# Patient Record
Sex: FEMALE | Race: WHITE | NOT HISPANIC OR LATINO | Employment: FULL TIME | URBAN - METROPOLITAN AREA
[De-identification: names, ages, dates, MRNs, and addresses within clinical notes are randomized per-mention and may not be internally consistent; named-entity substitution may affect disease eponyms.]

---

## 2017-06-20 ENCOUNTER — OFFICE VISIT (OUTPATIENT)
Dept: FAMILY MEDICINE CLINIC | Facility: CLINIC | Age: 37
End: 2017-06-20

## 2017-06-20 VITALS
BODY MASS INDEX: 22.73 KG/M2 | OXYGEN SATURATION: 99 % | SYSTOLIC BLOOD PRESSURE: 116 MMHG | HEIGHT: 67 IN | HEART RATE: 71 BPM | WEIGHT: 144.8 LBS | DIASTOLIC BLOOD PRESSURE: 70 MMHG

## 2017-06-20 DIAGNOSIS — K76.9 LESION OF LIVER: Primary | ICD-10-CM

## 2017-06-20 DIAGNOSIS — R74.8 ELEVATED LIPASE: ICD-10-CM

## 2017-06-20 DIAGNOSIS — K76.9 LIVER LESION: ICD-10-CM

## 2017-06-20 PROCEDURE — 99213 OFFICE O/P EST LOW 20 MIN: CPT | Performed by: NURSE PRACTITIONER

## 2017-06-20 NOTE — PROGRESS NOTES
"Sarahi Mooney is a 36 y.o. female.  Seen 06/20/2017    Assessment/Plan   Problem List Items Addressed This Visit     None             No Follow-up on file.  There are no Patient Instructions on file for this visit.    Vitals:    06/20/17 0835   BP: 116/70   Pulse: 71   SpO2: 99%   Weight: 144 lb 12.8 oz (65.7 kg)   Height: 67\" (170.2 cm)     Body mass index is 22.68 kg/(m^2).    Subjective     Chief Complaint   Patient presents with   • Abdominal Pain     Last Thursday ER ([facility]) follow up stated everything related to blood work and urine was normal CT showed a liver lesion pt states that hospital stated that they did not get a good view of the liver lesion   Had some abdominal pain that was lasting through the night so she went to [facility] and her CT scan and some bloodwork. Was informed that she had a \"lesion on her liver.\" Is requesting to have further testing  Social History   Substance Use Topics   • Smoking status: Never Smoker   • Smokeless tobacco: Never Used   • Alcohol use Yes      Comment: occ       History of Present Illness     The following portions of the patient's history were reviewed and updated as appropriate:PMHroutine: Social history , Allergies and Current Medications    Review of Systems   Constitutional:        Hospital Follow-up   Gastrointestinal: Positive for abdominal pain. Negative for abdominal distention.        Intermittent and generalized is much better since being seen in the emergency room.   Genitourinary: Negative for decreased urine volume, difficulty urinating, frequency and hematuria.   All other systems reviewed and are negative.      Objective   Physical Exam   Constitutional: She appears well-developed and well-nourished. No distress.   HENT:   Head: Normocephalic and atraumatic.   Eyes: EOM are normal.   Neck: Neck supple. No thyromegaly present.   Cardiovascular: Normal rate, regular rhythm and normal heart sounds.    Pulmonary/Chest: Effort normal and breath " sounds normal.   Abdominal: Soft. Bowel sounds are normal. She exhibits no distension. There is no tenderness. There is no rebound and no guarding.   Musculoskeletal: Normal range of motion.   Neurological: She is alert.   Skin: Skin is warm.   Nursing note and vitals reviewed.    Reviewed Data:  No visits with results within 1 Month(s) from this visit.  Latest known visit with results is:    Abstract on 02/29/2016   Component Date Value Ref Range Status   • HM Pap smear 03/18/2014 Negative (no evidence of intraepithelial lesion or malignancy)   Final   Reviewd labs and abdominal ct at [facility] probable hepatic adenoma.  Told her I would try and get her records and then call her back when i did and discuss what we were going to do.  Spoke with pt at 1330 hrs will do a hepatitis panel and f/u with gastroenterology  Pt good with plan

## 2017-07-05 LAB
BILIRUB DIRECT SERPL-MCNC: <0.2 MG/DL (ref 0–0.3)
GGT SERPL-CCNC: 10 U/L (ref 5–36)

## 2017-07-06 LAB
HBV CORE AB SERPL QL IA: NEGATIVE
HBV CORE IGM SERPL QL IA: NEGATIVE
HBV E AB SERPL QL IA: NEGATIVE
HBV E AG SERPL QL IA: NEGATIVE
HBV SURFACE AB SER QL: REACTIVE
HBV SURFACE AG SERPL QL IA: NEGATIVE
HCV AB S/CO SERPL IA: 0.1 S/CO RATIO (ref 0–0.9)
LABORATORY COMMENT REPORT: NORMAL

## 2017-07-17 ENCOUNTER — OFFICE VISIT (OUTPATIENT)
Dept: GASTROENTEROLOGY | Facility: CLINIC | Age: 37
End: 2017-07-17

## 2017-07-17 VITALS
BODY MASS INDEX: 22.95 KG/M2 | DIASTOLIC BLOOD PRESSURE: 68 MMHG | HEIGHT: 67 IN | SYSTOLIC BLOOD PRESSURE: 108 MMHG | WEIGHT: 146.2 LBS | TEMPERATURE: 97.9 F

## 2017-07-17 DIAGNOSIS — R93.89 ABNORMAL FINDING ON RADIOLOGY EXAM: Primary | ICD-10-CM

## 2017-07-17 PROCEDURE — 99203 OFFICE O/P NEW LOW 30 MIN: CPT | Performed by: INTERNAL MEDICINE

## 2017-07-17 NOTE — PROGRESS NOTES
Chief Complaint   Patient presents with   • Abdominal Pain       Sarahi Mooney is a 37 y.o. female who presents with Abnormal CAT scan, liver lesion    HPI Comments: 37-year-old female that was in the [facility] emergency room 4 weeks ago with abdominal pain.  CAT scan showed a 2 cm liver lesion, right lobe.  No central scar.  Radiologist described the disease either a hepatic adenoma or focal nodular hyperplasia, less likely hepatocellular carcinoma.  She has no right upper quadrant pain.  She was on oral contraceptive pills for many years.  She is not planning on getting pregnant again in her lifetime.  She has no jaundice, tea colored urine or acholic stools.      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     Social History Main Topics   • Smoking status: Never Smoker   • Smokeless tobacco: Never Used   • Alcohol use Yes      Comment: occ   • Drug use: No   • Sexual activity: Defer     Other Topics Concern   • Not on file     Social History Narrative       Family History   Problem Relation Age of Onset   • Hypertension Father    • Other Paternal Aunt      Thyroid   • Coronary artery disease Paternal Grandfather 55   • Stroke Other 75       Review of Systems   Gastrointestinal: Negative for abdominal distention, abdominal pain, anal bleeding, blood in stool, constipation, diarrhea, nausea and rectal pain.   All other systems  reviewed and are negative.      Vitals:    07/17/17 1319   BP: 108/68   Temp: 97.9 °F (36.6 °C)       Physical Exam   Constitutional: She is oriented to person, place, and time. She appears well-developed and well-nourished.   HENT:   Head: Normocephalic and atraumatic.   Eyes: EOM are normal. Pupils are equal, round, and reactive to light.   Cardiovascular: Normal rate, regular rhythm and normal heart sounds.    Pulmonary/Chest: Effort normal.   Abdominal: Soft. Bowel sounds are normal. She exhibits no distension and no mass. There is no tenderness. No hernia.   Musculoskeletal: Normal range of motion.   Neurological: She is alert and oriented to person, place, and time.   Skin: Skin is dry.   Psychiatric: She has a normal mood and affect. Her behavior is normal. Judgment and thought content normal.         Problem list    Abnormal CAT scan  Hepatic adenoma versus focal nodular hyperplasia      Assessment/Plan    I've recommended an MRI of the liver in 3 months to ensure it is not changing in size.  If it confirms an hepatic adenoma, due to the fact that she's not planning any further pregnancies, due to the fact that she is off oral contraceptive pills, due to the fact that is a small lesion and  she is asymptomatic I do think I would follow the lesion with imaging once a year for a few years as opposed to sending her to a hepatobiliary surgeon for resection

## 2017-08-28 ENCOUNTER — TRANSCRIBE ORDERS (OUTPATIENT)
Dept: ADMINISTRATIVE | Facility: HOSPITAL | Age: 37
End: 2017-08-28

## 2017-08-28 DIAGNOSIS — R93.89 ABNORMAL RADIOGRAPHIC EXAMINATION: Primary | ICD-10-CM

## 2017-09-14 DIAGNOSIS — J45.30 MILD PERSISTENT ASTHMA WITHOUT COMPLICATION: Primary | ICD-10-CM

## 2017-09-14 DIAGNOSIS — J45.20 MILD INTERMITTENT ASTHMA WITHOUT COMPLICATION: ICD-10-CM

## 2017-09-21 ENCOUNTER — OFFICE VISIT (OUTPATIENT)
Dept: FAMILY MEDICINE CLINIC | Facility: CLINIC | Age: 37
End: 2017-09-21

## 2017-09-21 VITALS
WEIGHT: 146 LBS | HEIGHT: 67 IN | SYSTOLIC BLOOD PRESSURE: 100 MMHG | DIASTOLIC BLOOD PRESSURE: 70 MMHG | HEART RATE: 72 BPM | BODY MASS INDEX: 22.91 KG/M2 | OXYGEN SATURATION: 99 %

## 2017-09-21 DIAGNOSIS — Z00.00 PHYSICAL EXAM: Primary | ICD-10-CM

## 2017-09-21 DIAGNOSIS — Z12.4 PAP SMEAR FOR CERVICAL CANCER SCREENING: ICD-10-CM

## 2017-09-21 PROCEDURE — 99395 PREV VISIT EST AGE 18-39: CPT | Performed by: NURSE PRACTITIONER

## 2017-09-21 NOTE — PROGRESS NOTES
"Sarahi Mooney is a 37 y.o. female.  Seen 09/21/2017  Here for her annual wellness exam.  Works at the [employer]   has 3 children  Wears glasses last eye exam 2 years ago.  Regular dental visits  Last Pap 3 years ago  Regular menstrual cycles  Assessment/Plan   Problem List Items Addressed This Visit     None      Visit Diagnoses     Physical exam    -  Primary    Relevant Orders    CBC Auto Differential    Comprehensive Metabolic Panel    Lipid Panel With / Chol / HDL Ratio    Pap smear for cervical cancer screening        Relevant Orders    Pap IG, Ct-Ng, Rfx HPV ASCU             No Follow-up on file.  There are no Patient Instructions on file for this visit.    Subjective     Chief Complaint   Patient presents with   • Annual Exam     Social History   Substance Use Topics   • Smoking status: Never Smoker   • Smokeless tobacco: Never Used   • Alcohol use Yes      Comment: occ       History of Present Illness     The following portions of the patient's history were reviewed and updated as appropriate:PMHroutine: Social history , Past Medical History, Surgical history , Allergies, Current Medications, Active Problem List, Family History and Health Maintenance    Review of Systems   Constitutional: Negative for fever.   Respiratory: Negative for cough.        Objective   Vitals:    09/21/17 0926   BP: 100/70   Pulse: 72   SpO2: 99%   Weight: 146 lb (66.2 kg)   Height: 67\" (170.2 cm)     Body mass index is 22.87 kg/(m^2).  Physical Exam   Constitutional: She appears well-developed and well-nourished. No distress.   HENT:   Head: Normocephalic and atraumatic.   Right Ear: External ear normal.   Left Ear: External ear normal.   Eyes: EOM are normal.   Neck: Neck supple. No thyromegaly present.   Cardiovascular: Normal rate, regular rhythm and normal heart sounds.    Pulmonary/Chest: Effort normal and breath sounds normal.   Genitourinary: Cervix exhibits no motion tenderness, no discharge and no friability. " Right adnexum displays no mass, no tenderness and no fullness. Left adnexum displays no mass, no tenderness and no fullness.   Genitourinary Comments: Breast smooth, no lumps or nipple discharge   Musculoskeletal: Normal range of motion.   Neurological: She is alert.   Skin: Skin is warm.   Nursing note and vitals reviewed.    Reviewed Data:  No visits with results within 1 Month(s) from this visit.  Latest known visit with results is:    Office Visit on 06/20/2017   Component Date Value Ref Range Status   • Bilirubin, Direct 07/05/2017 <0.2  0.0 - 0.3 mg/dL Final   • GGT 07/05/2017 10  5 - 36 U/L Final   • Hepatitis B Surface Ag 07/05/2017 Negative  Negative Final   • Hep B E Ag 07/05/2017 Negative  Negative Final   • Hep B Core IgM 07/05/2017 Negative  Negative Final   • Hep B Core Total Ab 07/05/2017 Negative  Negative Final   • Hep B E Ab 07/05/2017 Negative  Negative Final   • Hep B S Ab 07/05/2017 Reactive   Final    Comment:               Non Reactive: Inconsistent with immunity,                              less than 10 mIU/mL                Reactive:     Consistent with immunity,                              greater than 9.9 mIU/mL     • Hepatitis C Ab 07/05/2017 0.1  0.0 - 0.9 s/co ratio Final   • Comment 07/05/2017 Comment   Final    Comment: Non reactive HCV antibody screen is consistent with no HCV infection,  unless recent infection is suspected or other evidence exists to  indicate HCV infection.       Will call with results

## 2017-09-22 LAB
ALBUMIN SERPL-MCNC: 4.5 G/DL (ref 3.5–5.2)
ALBUMIN/GLOB SERPL: 2.4 G/DL
ALP SERPL-CCNC: 40 U/L (ref 39–117)
ALT SERPL-CCNC: 13 U/L (ref 1–33)
AST SERPL-CCNC: 15 U/L (ref 1–32)
BASOPHILS # BLD AUTO: 0.03 10*3/MM3 (ref 0–0.2)
BASOPHILS NFR BLD AUTO: 0.5 % (ref 0–1.5)
BILIRUB SERPL-MCNC: 0.3 MG/DL (ref 0.1–1.2)
BUN SERPL-MCNC: 15 MG/DL (ref 6–20)
BUN/CREAT SERPL: 19.5 (ref 7–25)
CALCIUM SERPL-MCNC: 9.6 MG/DL (ref 8.6–10.5)
CHLORIDE SERPL-SCNC: 102 MMOL/L (ref 98–107)
CHOLEST SERPL-MCNC: 195 MG/DL (ref 0–200)
CHOLEST/HDLC SERPL: 2.6 {RATIO}
CO2 SERPL-SCNC: 27.4 MMOL/L (ref 22–29)
CREAT SERPL-MCNC: 0.77 MG/DL (ref 0.57–1)
EOSINOPHIL # BLD AUTO: 0.1 10*3/MM3 (ref 0–0.7)
EOSINOPHIL NFR BLD AUTO: 1.6 % (ref 0.3–6.2)
ERYTHROCYTE [DISTWIDTH] IN BLOOD BY AUTOMATED COUNT: 13.5 % (ref 11.7–13)
GLOBULIN SER CALC-MCNC: 1.9 GM/DL
GLUCOSE SERPL-MCNC: 70 MG/DL (ref 65–99)
HCT VFR BLD AUTO: 37.4 % (ref 35.6–45.5)
HDLC SERPL-MCNC: 75 MG/DL (ref 40–60)
HGB BLD-MCNC: 12.1 G/DL (ref 11.9–15.5)
IMM GRANULOCYTES # BLD: 0 10*3/MM3 (ref 0–0.03)
IMM GRANULOCYTES NFR BLD: 0 % (ref 0–0.5)
LDLC SERPL CALC-MCNC: 107 MG/DL (ref 0–100)
LYMPHOCYTES # BLD AUTO: 2.46 10*3/MM3 (ref 0.9–4.8)
LYMPHOCYTES NFR BLD AUTO: 38.8 % (ref 19.6–45.3)
MCH RBC QN AUTO: 31.7 PG (ref 26.9–32)
MCHC RBC AUTO-ENTMCNC: 32.4 G/DL (ref 32.4–36.3)
MCV RBC AUTO: 97.9 FL (ref 80.5–98.2)
MONOCYTES # BLD AUTO: 0.47 10*3/MM3 (ref 0.2–1.2)
MONOCYTES NFR BLD AUTO: 7.4 % (ref 5–12)
NEUTROPHILS # BLD AUTO: 3.28 10*3/MM3 (ref 1.9–8.1)
NEUTROPHILS NFR BLD AUTO: 51.7 % (ref 42.7–76)
PLATELET # BLD AUTO: 286 10*3/MM3 (ref 140–500)
POTASSIUM SERPL-SCNC: 4.5 MMOL/L (ref 3.5–5.2)
PROT SERPL-MCNC: 6.4 G/DL (ref 6–8.5)
RBC # BLD AUTO: 3.82 10*6/MM3 (ref 3.9–5.2)
SODIUM SERPL-SCNC: 140 MMOL/L (ref 136–145)
TRIGL SERPL-MCNC: 64 MG/DL (ref 0–150)
VLDLC SERPL CALC-MCNC: 12.8 MG/DL (ref 5–40)
WBC # BLD AUTO: 6.34 10*3/MM3 (ref 4.5–10.7)

## 2017-09-25 LAB
C TRACH RRNA CVX QL NAA+PROBE: NEGATIVE
CONV .: NORMAL
CYTOLOGIST CVX/VAG CYTO: NORMAL
CYTOLOGY CVX/VAG DOC THIN PREP: NORMAL
DX ICD CODE: NORMAL
HIV 1 & 2 AB SER-IMP: NORMAL
N GONORRHOEA RRNA CVX QL NAA+PROBE: NEGATIVE
OTHER STN SPEC: NORMAL
PATH REPORT.FINAL DX SPEC: NORMAL
STAT OF ADQ CVX/VAG CYTO-IMP: NORMAL

## 2017-10-05 ENCOUNTER — TELEPHONE (OUTPATIENT)
Dept: GASTROENTEROLOGY | Facility: CLINIC | Age: 37
End: 2017-10-05

## 2017-10-05 NOTE — TELEPHONE ENCOUNTER
Anthony from Xray at [facility] called and requested to correct a MRI of the abdomen order. States it needs to read with and without contrast.  Permission given to change the order.

## 2017-10-09 ENCOUNTER — HOSPITAL ENCOUNTER (OUTPATIENT)
Dept: MRI IMAGING | Facility: HOSPITAL | Age: 37
Discharge: HOME | End: 2017-10-09
Attending: INTERNAL MEDICINE | Admitting: INTERNAL MEDICINE

## 2017-10-09 DIAGNOSIS — R93.89 ABNORMAL RADIOGRAPHIC EXAMINATION: ICD-10-CM

## 2017-10-09 PROCEDURE — A9577 INJ MULTIHANCE: HCPCS | Performed by: INTERNAL MEDICINE

## 2017-10-09 PROCEDURE — 74183 MRI ABD W/O CNTR FLWD CNTR: CPT

## 2017-10-09 PROCEDURE — 0 GADOBENATE DIMEGLUMINE 529 MG/ML SOLUTION: Performed by: INTERNAL MEDICINE

## 2017-10-09 RX ADMIN — GADOBENATE DIMEGLUMINE 13 ML: 529 INJECTION, SOLUTION INTRAVENOUS at 08:00

## 2017-10-12 ENCOUNTER — TELEPHONE (OUTPATIENT)
Dept: GASTROENTEROLOGY | Facility: CLINIC | Age: 37
End: 2017-10-12

## 2017-10-12 DIAGNOSIS — K76.89 HEPATIC FOCAL NODULAR HYPERPLASIA: Primary | ICD-10-CM

## 2017-10-12 NOTE — TELEPHONE ENCOUNTER
----- Message from Lloyd Kauffman MD sent at 10/12/2017  1:18 PM EDT -----  Benign focal nodular hyperplasia.  Recommendation of MRI in 1 year to confirm stability.  If it is stable in one year no further workup is required

## 2017-10-12 NOTE — PROGRESS NOTES
Benign focal nodular hyperplasia.  Recommendation of MRI in 1 year to confirm stability.  If it is stable in one year no further workup is required

## 2017-10-13 NOTE — TELEPHONE ENCOUNTER
Patient returned phone call, advised as per Dr. Kauffman's note. She verb understanding is in agreement with the plan.

## 2018-09-25 ENCOUNTER — OFFICE VISIT (OUTPATIENT)
Dept: FAMILY MEDICINE CLINIC | Facility: CLINIC | Age: 38
End: 2018-09-25

## 2018-09-25 VITALS
SYSTOLIC BLOOD PRESSURE: 102 MMHG | HEIGHT: 67 IN | BODY MASS INDEX: 22.91 KG/M2 | WEIGHT: 146 LBS | DIASTOLIC BLOOD PRESSURE: 60 MMHG | HEART RATE: 66 BPM | OXYGEN SATURATION: 99 %

## 2018-09-25 DIAGNOSIS — Z00.00 PHYSICAL EXAM: Primary | ICD-10-CM

## 2018-09-25 DIAGNOSIS — J45.909 MILD ASTHMA, UNSPECIFIED WHETHER COMPLICATED, UNSPECIFIED WHETHER PERSISTENT: ICD-10-CM

## 2018-09-25 LAB
ALBUMIN SERPL-MCNC: 4.8 G/DL (ref 3.5–5.2)
ALBUMIN/GLOB SERPL: 2 G/DL
ALP SERPL-CCNC: 50 U/L (ref 39–117)
ALT SERPL-CCNC: 13 U/L (ref 1–33)
AST SERPL-CCNC: 13 U/L (ref 1–32)
BASOPHILS # BLD AUTO: 0.04 10*3/MM3 (ref 0–0.2)
BASOPHILS NFR BLD AUTO: 0.7 % (ref 0–1.5)
BILIRUB SERPL-MCNC: 0.4 MG/DL (ref 0.1–1.2)
BUN SERPL-MCNC: 12 MG/DL (ref 6–20)
BUN/CREAT SERPL: 13.2 (ref 7–25)
CALCIUM SERPL-MCNC: 9.8 MG/DL (ref 8.6–10.5)
CHLORIDE SERPL-SCNC: 101 MMOL/L (ref 98–107)
CHOLEST SERPL-MCNC: 211 MG/DL (ref 0–200)
CHOLEST/HDLC SERPL: 3.06 {RATIO}
CO2 SERPL-SCNC: 28.9 MMOL/L (ref 22–29)
CREAT SERPL-MCNC: 0.91 MG/DL (ref 0.57–1)
EOSINOPHIL # BLD AUTO: 0.08 10*3/MM3 (ref 0–0.7)
EOSINOPHIL NFR BLD AUTO: 1.4 % (ref 0.3–6.2)
ERYTHROCYTE [DISTWIDTH] IN BLOOD BY AUTOMATED COUNT: 12.8 % (ref 11.7–13)
GLOBULIN SER CALC-MCNC: 2.4 GM/DL
GLUCOSE SERPL-MCNC: 93 MG/DL (ref 65–99)
HCT VFR BLD AUTO: 40.2 % (ref 35.6–45.5)
HDLC SERPL-MCNC: 69 MG/DL (ref 40–60)
HGB BLD-MCNC: 13.4 G/DL (ref 11.9–15.5)
IMM GRANULOCYTES # BLD: 0.02 10*3/MM3 (ref 0–0.03)
IMM GRANULOCYTES NFR BLD: 0.3 % (ref 0–0.5)
LDLC SERPL CALC-MCNC: 127 MG/DL (ref 0–100)
LYMPHOCYTES # BLD AUTO: 2.32 10*3/MM3 (ref 0.9–4.8)
LYMPHOCYTES NFR BLD AUTO: 40 % (ref 19.6–45.3)
MCH RBC QN AUTO: 31.9 PG (ref 26.9–32)
MCHC RBC AUTO-ENTMCNC: 33.3 G/DL (ref 32.4–36.3)
MCV RBC AUTO: 95.7 FL (ref 80.5–98.2)
MONOCYTES # BLD AUTO: 0.37 10*3/MM3 (ref 0.2–1.2)
MONOCYTES NFR BLD AUTO: 6.4 % (ref 5–12)
NEUTROPHILS # BLD AUTO: 2.99 10*3/MM3 (ref 1.9–8.1)
NEUTROPHILS NFR BLD AUTO: 51.5 % (ref 42.7–76)
PLATELET # BLD AUTO: 397 10*3/MM3 (ref 140–500)
POTASSIUM SERPL-SCNC: 4.7 MMOL/L (ref 3.5–5.2)
PROT SERPL-MCNC: 7.2 G/DL (ref 6–8.5)
RBC # BLD AUTO: 4.2 10*6/MM3 (ref 3.9–5.2)
SODIUM SERPL-SCNC: 139 MMOL/L (ref 136–145)
TRIGL SERPL-MCNC: 77 MG/DL (ref 0–150)
VLDLC SERPL CALC-MCNC: 15.4 MG/DL (ref 5–40)
WBC # BLD AUTO: 5.8 10*3/MM3 (ref 4.5–10.7)

## 2018-09-25 PROCEDURE — 99395 PREV VISIT EST AGE 18-39: CPT | Performed by: NURSE PRACTITIONER

## 2018-10-05 ENCOUNTER — HOSPITAL ENCOUNTER (OUTPATIENT)
Dept: MRI IMAGING | Facility: HOSPITAL | Age: 38
Discharge: HOME | End: 2018-10-05
Attending: INTERNAL MEDICINE | Admitting: INTERNAL MEDICINE

## 2018-10-05 DIAGNOSIS — K76.89 HEPATIC FOCAL NODULAR HYPERPLASIA: ICD-10-CM

## 2018-10-05 PROCEDURE — A9581 GADOXETATE DISODIUM INJ: HCPCS | Performed by: INTERNAL MEDICINE

## 2018-10-05 PROCEDURE — 74183 MRI ABD W/O CNTR FLWD CNTR: CPT

## 2018-10-05 PROCEDURE — 25010000002 GADOXETATE 0.25 MOL/L SOLUTION: Performed by: INTERNAL MEDICINE

## 2018-10-05 RX ADMIN — GADOXETATE DISODIUM 7 ML: 181.43 INJECTION, SOLUTION INTRAVENOUS at 09:06

## 2018-10-09 NOTE — PROGRESS NOTES
Lesion is completely stable measuring the exact same size  No further workup required  Returned referring physician

## 2018-10-12 ENCOUNTER — TELEPHONE (OUTPATIENT)
Dept: GASTROENTEROLOGY | Facility: CLINIC | Age: 38
End: 2018-10-12

## 2018-10-12 NOTE — TELEPHONE ENCOUNTER
Returned phone call, no answer, message left(VM identified patient) advising as per Dr. Kauffman's note. Advised to call back with questions or concerns.

## 2018-10-12 NOTE — TELEPHONE ENCOUNTER
----- Message from Socrates Marvin sent at 10/12/2018  2:17 PM EDT -----  Regarding: Pt called for her mri results   Contact: [phone]  ..

## 2018-10-12 NOTE — TELEPHONE ENCOUNTER
----- Message from Lloyd Kauffman MD sent at 10/9/2018  3:29 PM EDT -----  Lesion is completely stable measuring the exact same size  No further workup required  Returned referring physician

## 2019-01-09 RX ORDER — ALBUTEROL SULFATE 90 UG/1
2 AEROSOL, METERED RESPIRATORY (INHALATION) EVERY 4 HOURS PRN
Qty: 1 INHALER | Refills: 5 | Status: SHIPPED | OUTPATIENT
Start: 2019-01-09

## 2020-03-09 ENCOUNTER — OFFICE VISIT (OUTPATIENT)
Dept: FAMILY MEDICINE CLINIC | Facility: CLINIC | Age: 40
End: 2020-03-09

## 2020-03-09 VITALS
RESPIRATION RATE: 16 BRPM | SYSTOLIC BLOOD PRESSURE: 108 MMHG | HEART RATE: 92 BPM | DIASTOLIC BLOOD PRESSURE: 76 MMHG | TEMPERATURE: 98.3 F | BODY MASS INDEX: 23.23 KG/M2 | HEIGHT: 67 IN | OXYGEN SATURATION: 98 % | WEIGHT: 148 LBS

## 2020-03-09 DIAGNOSIS — L30.8 OTHER ECZEMA: ICD-10-CM

## 2020-03-09 DIAGNOSIS — Z00.00 PHYSICAL EXAM: Primary | ICD-10-CM

## 2020-03-09 PROCEDURE — 99395 PREV VISIT EST AGE 18-39: CPT | Performed by: NURSE PRACTITIONER

## 2020-03-09 RX ORDER — TRIAMCINOLONE ACETONIDE 1 MG/G
CREAM TOPICAL 2 TIMES DAILY
Qty: 60 G | Refills: 0 | Status: SHIPPED | OUTPATIENT
Start: 2020-03-09 | End: 2020-03-19

## 2020-03-09 NOTE — PROGRESS NOTES
Subjective Physical exam  Sarahi Mooney is a 39 y.o. female.     History of Present Illness   PMHX:reviewed in chart and with pt.      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    Review of Systems   Constitutional: Negative for activity change, appetite change, chills and fever.   HENT: Negative for congestion.    Eyes: Negative for pain.   Respiratory: Negative for cough and shortness of breath.    Cardiovascular: Negative for chest pain and leg swelling.   Gastrointestinal: Negative for nausea and vomiting.   Genitourinary: Negative for difficulty urinating.   Skin: Negative for rash.        She has a rash that waxes and wanes to the back right lower portion of her scalp   Neurological: Negative for dizziness, facial asymmetry and headache.       Objective   Physical Exam   Constitutional: She is oriented to person, place, and time. She appears well-developed and well-nourished.   HENT:   Head: Normocephalic and atraumatic.   Right Ear: External ear normal.   Left Ear: External ear normal.   Mouth/Throat: Oropharynx is clear and moist.   Eyes: Pupils are equal, round, and reactive to light. Conjunctivae and EOM are normal.   Neck: Neck supple.   Cardiovascular: Normal rate and regular rhythm.   Pulmonary/Chest: Effort normal and breath sounds normal. No respiratory distress.   Abdominal: Bowel sounds are normal.   Musculoskeletal: Normal range of motion.   Lymphadenopathy:     She has no cervical adenopathy.   Neurological: She is alert and oriented to person, place, and time.   Skin: Skin is warm and dry. Rash noted.   Seborrheic type rash to the bottom right scalp   Psychiatric: She has a normal mood  and affect.   Nursing note and vitals reviewed.        Assessment/Plan   Problem List Items Addressed This Visit     None      Visit Diagnoses     Physical exam    -  Primary    Other eczema        Relevant Medications    triamcinolone (KENALOG) 0.1 % cream    Other Relevant Orders    CBC (No Diff) (Completed)               No follow-ups on file.

## 2020-03-09 NOTE — PATIENT INSTRUCTIONS
Preventive Care 21-39 Years Old, Female  Preventive care refers to visits with your health care provider and lifestyle choices that can promote health and wellness. This includes:  · A yearly physical exam. This may also be called an annual well check.  · Regular dental visits and eye exams.  · Immunizations.  · Screening for certain conditions.  · Healthy lifestyle choices, such as eating a healthy diet, getting regular exercise, not using drugs or products that contain nicotine and tobacco, and limiting alcohol use.  What can I expect for my preventive care visit?  Physical exam  Your health care provider will check your:  · Height and weight. This may be used to calculate body mass index (BMI), which tells if you are at a healthy weight.  · Heart rate and blood pressure.  · Skin for abnormal spots.  Counseling  Your health care provider may ask you questions about your:  · Alcohol, tobacco, and drug use.  · Emotional well-being.  · Home and relationship well-being.  · Sexual activity.  · Eating habits.  · Work and work environment.  · Method of birth control.  · Menstrual cycle.  · Pregnancy history.  What immunizations do I need?    Influenza (flu) vaccine  · This is recommended every year.  Tetanus, diphtheria, and pertussis (Tdap) vaccine  · You may need a Td booster every 10 years.  Varicella (chickenpox) vaccine  · You may need this if you have not been vaccinated.  Human papillomavirus (HPV) vaccine  · If recommended by your health care provider, you may need three doses over 6 months.  Measles, mumps, and rubella (MMR) vaccine  · You may need at least one dose of MMR. You may also need a second dose.  Meningococcal conjugate (MenACWY) vaccine  · One dose is recommended if you are age 19-21 years and a first-year college student living in a residence [place], or if you have one of several medical conditions. You may also need additional booster doses.  Pneumococcal conjugate (PCV13) vaccine  · You may need  this if you have certain conditions and were not previously vaccinated.  Pneumococcal polysaccharide (PPSV23) vaccine  · You may need one or two doses if you smoke cigarettes or if you have certain conditions.  Hepatitis A vaccine  · You may need this if you have certain conditions or if you travel or work in places where you may be exposed to hepatitis A.  Hepatitis B vaccine  · You may need this if you have certain conditions or if you travel or work in places where you may be exposed to hepatitis B.  Haemophilus influenzae type b (Hib) vaccine  · You may need this if you have certain conditions.  You may receive vaccines as individual doses or as more than one vaccine together in one shot (combination vaccines). Talk with your health care provider about the risks and benefits of combination vaccines.  What tests do I need?    Blood tests  · Lipid and cholesterol levels. These may be checked every 5 years starting at age 20.  · Hepatitis C test.  · Hepatitis B test.  Screening  · Diabetes screening. This is done by checking your blood sugar (glucose) after you have not eaten for a while (fasting).  · Sexually transmitted disease (STD) testing.  · BRCA-related cancer screening. This may be done if you have a family history of breast, ovarian, tubal, or peritoneal cancers.  · Pelvic exam and Pap test. This may be done every 3 years starting at age 21. Starting at age 30, this may be done every 5 years if you have a Pap test in combination with an HPV test.  Talk with your health care provider about your test results, treatment options, and if necessary, the need for more tests.  Follow these instructions at home:  Eating and drinking    · Eat a diet that includes fresh fruits and vegetables, whole grains, lean protein, and low-fat dairy.  · Take vitamin and mineral supplements as recommended by your health care provider.  · Do not drink alcohol if:  ? Your health care provider tells you not to drink.  ? You are  pregnant, may be pregnant, or are planning to become pregnant.  · If you drink alcohol:  ? Limit how much you have to 0-1 drink a day.  ? Be aware of how much alcohol is in your drink. In the [place] one drink equals one 12 oz bottle of beer (355 mL), one 5 oz glass of wine (148 mL), or one 1½ oz glass of hard liquor (44 mL).  Lifestyle  · Take daily care of your teeth and gums.  · Stay active. Exercise for at least 30 minutes on 5 or more days each week.  · Do not use any products that contain nicotine or tobacco, such as cigarettes, e-cigarettes, and chewing tobacco. If you need help quitting, ask your health care provider.  · If you are sexually active, practice safe sex. Use a condom or other form of birth control (contraception) in order to prevent pregnancy and STIs (sexually transmitted infections). If you plan to become pregnant, see your health care provider for a preconception visit.  What's next?  · Visit your health care provider once a year for a well check visit.  · Ask your health care provider how often you should have your eyes and teeth checked.  · Stay up to date on all vaccines.  This information is not intended to replace advice given to you by your health care provider. Make sure you discuss any questions you have with your health care provider.  Document Released: 02/13/2003 Document Revised: 08/29/2019 Document Reviewed: 08/29/2019  [employer] Interactive Patient Education © 2020 [employer]

## 2020-03-10 LAB
ERYTHROCYTE [DISTWIDTH] IN BLOOD BY AUTOMATED COUNT: 12.2 % (ref 11.7–15.4)
HCT VFR BLD AUTO: 41.1 % (ref 34–46.6)
HGB BLD-MCNC: 13.4 G/DL (ref 11.1–15.9)
MCH RBC QN AUTO: 32.1 PG (ref 26.6–33)
MCHC RBC AUTO-ENTMCNC: 32.6 G/DL (ref 31.5–35.7)
MCV RBC AUTO: 99 FL (ref 79–97)
PLATELET # BLD AUTO: 334 X10E3/UL (ref 150–450)
RBC # BLD AUTO: 4.17 X10E6/UL (ref 3.77–5.28)
WBC # BLD AUTO: 8.4 X10E3/UL (ref 3.4–10.8)

## 2021-04-01 ENCOUNTER — TELEPHONE (OUTPATIENT)
Dept: FAMILY MEDICINE CLINIC | Facility: CLINIC | Age: 41
End: 2021-04-01

## 2021-04-01 NOTE — TELEPHONE ENCOUNTER
Caller: Sarahi Mooney    Relationship: Self    Best call back number: [phone]    What orders are you requesting (i.e. lab or imaging):  FASTING LABS    In what timeframe would the patient need to come in: WITHIN THE NEXT TWO WEEKS, HAS AN APPOINTMENT 04/26/21    Where will you receive your lab/imaging services: IN OFFICE, [facility]     Additional notes:       PATIENT IS SCHEDULED FOR A YEARLY PHYSICAL WITH A PAP ON 04/26/21 AT 3 IN THE AFTERNOON.    PATIENT IS CURIOUS ABOUT FASTING LABS, WHETHER SHE NEEDS THEM OR NOT AND IF SO CAN WE PUT ORDERS IN FOR HER TO GET LABS DONE A WEEK BEFORE HER PHYSICAL SO SHE'LL HAVE THE RESULTS AND CAN GO OVER THESE AT HER APPOINTMENT.

## 2021-04-16 ENCOUNTER — BULK ORDERING (OUTPATIENT)
Dept: CASE MANAGEMENT | Facility: OTHER | Age: 41
End: 2021-04-16

## 2021-04-16 DIAGNOSIS — Z23 IMMUNIZATION DUE: ICD-10-CM

## 2021-04-26 ENCOUNTER — OFFICE VISIT (OUTPATIENT)
Dept: FAMILY MEDICINE CLINIC | Facility: CLINIC | Age: 41
End: 2021-04-26

## 2021-04-26 VITALS
WEIGHT: 144 LBS | DIASTOLIC BLOOD PRESSURE: 84 MMHG | RESPIRATION RATE: 14 BRPM | HEIGHT: 67 IN | SYSTOLIC BLOOD PRESSURE: 124 MMHG | OXYGEN SATURATION: 99 % | BODY MASS INDEX: 22.6 KG/M2 | HEART RATE: 73 BPM

## 2021-04-26 DIAGNOSIS — F41.9 ANXIETY: Primary | ICD-10-CM

## 2021-04-26 DIAGNOSIS — Z00.00 PHYSICAL EXAM: ICD-10-CM

## 2021-04-26 PROCEDURE — 99214 OFFICE O/P EST MOD 30 MIN: CPT | Performed by: NURSE PRACTITIONER

## 2021-04-26 RX ORDER — SPIRONOLACTONE 50 MG/1
TABLET, FILM COATED ORAL
COMMUNITY
Start: 2021-03-30 | End: 2022-03-14

## 2021-04-26 RX ORDER — ALPRAZOLAM 0.25 MG/1
0.25 TABLET ORAL 2 TIMES DAILY PRN
Qty: 40 TABLET | Refills: 0 | Status: SHIPPED | OUTPATIENT
Start: 2021-04-26 | End: 2021-10-14 | Stop reason: SDUPTHER

## 2021-04-26 RX ORDER — MINOCYCLINE HYDROCHLORIDE 100 MG/1
100 CAPSULE ORAL 2 TIMES DAILY
COMMUNITY
End: 2022-03-14

## 2021-04-26 NOTE — PROGRESS NOTES
Subjective   Sarahi Mooney is a 40 y.o. female.   Annual Exam, Anxiety, and Depression    History of Present Illness     The following portions of the patient's history were reviewed and updated as appropriate: allergies, current medications, past family history, past medical history, past social history, past surgical history and problem list.    Review of Systems    Objective   Physical Exam      Assessment/Plan   Problem List Items Addressed This Visit     None               No follow-ups on file.

## 2021-04-26 NOTE — PROGRESS NOTES
Subjective   Sarahi Mooney is a 40 y.o. female.   PMHX:Anxiety and depression. Lost her  this past year.Has tried antidepressant in the past and it did not work.She is requesting some xanax.  PSHX:Nothing new, discussed with pt and reviewed in chart.  PFHX:Mother has hypertension  Exercise:currently doing cross fit and lifting weights  Diet:Well balanced  Sleep hygiene:mediocre  Employment status:therapist      History of Present Illness   See above   The following portions of the patient's history were reviewed and updated as appropriate: allergies, current medications, past family history, past medical history, past social history, past surgical history and problem list.    Review of Systems   Constitutional: Negative for activity change, appetite change, chills and fever.   HENT: Negative for congestion.    Eyes: Negative for pain.   Respiratory: Negative for cough and shortness of breath.    Cardiovascular: Negative for chest pain and leg swelling.   Gastrointestinal: Negative for nausea and vomiting.   Genitourinary: Negative for difficulty urinating.   Skin: Negative for rash.   Neurological: Negative for dizziness, facial asymmetry and headache.       Objective   Physical Exam  Vitals and nursing note reviewed.   Constitutional:       General: She is not in acute distress.     Appearance: She is well-developed.   HENT:      Head: Normocephalic and atraumatic.      Right Ear: External ear normal.      Left Ear: External ear normal.   Neck:      Thyroid: No thyromegaly.   Cardiovascular:      Rate and Rhythm: Normal rate and regular rhythm.      Heart sounds: Normal heart sounds.   Pulmonary:      Effort: Pulmonary effort is normal.      Breath sounds: Normal breath sounds.   Musculoskeletal:         General: Normal range of motion.      Cervical back: Neck supple.   Skin:     General: Skin is warm.   Neurological:      Mental Status: She is alert.           Assessment/Plan   Diagnoses and all orders  for this visit:    1. Anxiety (Primary)  -     ALPRAZolam (XANAX) 0.25 MG tablet; Take 1 tablet by mouth 2 (Two) Times a Day As Needed for Anxiety.  Dispense: 40 tablet; Refill: 0    2. Physical exam  -     Comprehensive Metabolic Panel  -     Lipid Panel With LDL / HDL Ratio      [place] reviewed and clean

## 2021-04-26 NOTE — PATIENT INSTRUCTIONS
Preventive Care 21-39 Years Old, Female  Preventive care refers to lifestyle choices and visits with your health care provider that can promote health and wellness. This includes:  · A yearly physical exam. This is also called an annual wellness visit.  · Regular dental and eye exams.  · Immunizations.  · Screening for certain conditions.  · Healthy lifestyle choices, such as:  ? Eating a healthy diet.  ? Getting regular exercise.  ? Not using drugs or products that contain nicotine and tobacco.  ? Limiting alcohol use.  What can I expect for my preventive care visit?  Physical exam  Your health care provider may check your:  · Height and weight. These may be used to calculate your BMI (body mass index). BMI is a measurement that tells if you are at a healthy weight.  · Heart rate and blood pressure.  · Body temperature.  · Skin for abnormal spots.  Counseling  Your health care provider may ask you questions about your:  · Past medical problems.  · Family's medical history.  · Alcohol, tobacco, and drug use.  · Emotional well-being.  · Home life and relationship well-being.  · Sexual activity.  · Diet, exercise, and sleep habits.  · Work and work environment.  · Access to firearms.  · Method of birth control.  · Menstrual cycle.  · Pregnancy history.  What immunizations do I need?    Vaccines are usually given at various ages, according to a schedule. Your health care provider will recommend vaccines for you based on your age, medical history, and lifestyle or other factors, such as travel or where you work.  What tests do I need?    Blood tests  · Lipid and cholesterol levels. These may be checked every 5 years starting at age 20.  · Hepatitis C test.  · Hepatitis B test.  Screening  · Diabetes screening. This is done by checking your blood sugar (glucose) after you have not eaten for a while (fasting).  · STD (sexually transmitted disease) testing, if you are at risk.  · BRCA-related cancer screening. This may be  done if you have a family history of breast, ovarian, tubal, or peritoneal cancers.  · Pelvic exam and Pap test. This may be done every 3 years starting at age 21. Starting at age 30, this may be done every 5 years if you have a Pap test in combination with an HPV test.  Talk with your health care provider about your test results, treatment options, and if necessary, the need for more tests.  Follow these instructions at home:  Eating and drinking    · Eat a healthy diet that includes fresh fruits and vegetables, whole grains, lean protein, and low-fat dairy products.  · Take vitamin and mineral supplements as recommended by your health care provider.  · Do not drink alcohol if:  ? Your health care provider tells you not to drink.  ? You are pregnant, may be pregnant, or are planning to become pregnant.  · If you drink alcohol:  ? Limit how much you have to 0-1 drink a day.  ? Be aware of how much alcohol is in your drink. In the [place] one drink equals one 12 oz bottle of beer (355 mL), one 5 oz glass of wine (148 mL), or one 1½ oz glass of hard liquor (44 mL).  Lifestyle  · Take daily care of your teeth and gums. Brush your teeth every morning and night with fluoride toothpaste. Floss one time each day.  · Stay active. Exercise for at least 30 minutes 5 or more days each week.  · Do not use any products that contain nicotine or tobacco, such as cigarettes, e-cigarettes, and chewing tobacco. If you need help quitting, ask your health care provider.  · Do not use drugs.  · If you are sexually active, practice safe sex. Use a condom or other form of birth control (contraception) in order to prevent pregnancy and STIs (sexually transmitted infections). If you plan to become pregnant, see your health care provider for a pre-conception visit.  · Find healthy ways to cope with stress, such as:  ? Meditation, yoga, or listening to music.  ? Journaling.  ? Talking to a trusted person.  ? Spending time with friends and  family.  Safety  · Always wear your seat belt while driving or riding in a vehicle.  · Do not drive:  ? If you have been drinking alcohol. Do not ride with someone who has been drinking.  ? When you are tired or distracted.  ? While texting.  · Wear a helmet and other protective equipment during sports activities.  · If you have firearms in your house, make sure you follow all gun safety procedures.  · Seek help if you have been physically or sexually abused.  What's next?  · Go to your health care provider once a year for an annual wellness visit.  · Ask your health care provider how often you should have your eyes and teeth checked.  · Stay up to date on all vaccines.  This information is not intended to replace advice given to you by your health care provider. Make sure you discuss any questions you have with your health care provider.  Document Revised: 09/02/2020 Document Reviewed: 08/29/2019  [employer] Patient Education © 2021 [employer].

## 2021-04-27 LAB
ALBUMIN SERPL-MCNC: 4.5 G/DL (ref 3.8–4.8)
ALBUMIN/GLOB SERPL: 2.1 {RATIO} (ref 1.2–2.2)
ALP SERPL-CCNC: 54 IU/L (ref 39–117)
ALT SERPL-CCNC: 12 IU/L (ref 0–32)
AST SERPL-CCNC: 16 IU/L (ref 0–40)
BILIRUB SERPL-MCNC: <0.2 MG/DL (ref 0–1.2)
BUN SERPL-MCNC: 25 MG/DL (ref 6–20)
BUN/CREAT SERPL: 25 (ref 9–23)
CALCIUM SERPL-MCNC: 9 MG/DL (ref 8.7–10.2)
CHLORIDE SERPL-SCNC: 104 MMOL/L (ref 96–106)
CHOLEST SERPL-MCNC: 198 MG/DL (ref 100–199)
CO2 SERPL-SCNC: 23 MMOL/L (ref 20–29)
CREAT SERPL-MCNC: 0.99 MG/DL (ref 0.57–1)
GLOBULIN SER CALC-MCNC: 2.1 G/DL (ref 1.5–4.5)
GLUCOSE SERPL-MCNC: 88 MG/DL (ref 65–99)
HDLC SERPL-MCNC: 66 MG/DL
LDLC SERPL CALC-MCNC: 119 MG/DL (ref 0–99)
LDLC/HDLC SERPL: 1.8 RATIO (ref 0–3.2)
POTASSIUM SERPL-SCNC: 4.5 MMOL/L (ref 3.5–5.2)
PROT SERPL-MCNC: 6.6 G/DL (ref 6–8.5)
SODIUM SERPL-SCNC: 139 MMOL/L (ref 134–144)
TRIGL SERPL-MCNC: 70 MG/DL (ref 0–149)
VLDLC SERPL CALC-MCNC: 13 MG/DL (ref 5–40)

## 2021-06-02 ENCOUNTER — OFFICE VISIT (OUTPATIENT)
Dept: FAMILY MEDICINE CLINIC | Facility: CLINIC | Age: 41
End: 2021-06-02

## 2021-06-02 VITALS
HEART RATE: 55 BPM | BODY MASS INDEX: 22.29 KG/M2 | RESPIRATION RATE: 14 BRPM | DIASTOLIC BLOOD PRESSURE: 82 MMHG | WEIGHT: 142 LBS | OXYGEN SATURATION: 100 % | HEIGHT: 67 IN | SYSTOLIC BLOOD PRESSURE: 102 MMHG

## 2021-06-02 DIAGNOSIS — Z00.00 PHYSICAL EXAM: ICD-10-CM

## 2021-06-02 DIAGNOSIS — Z12.4 CERVICAL CANCER SCREENING: Primary | ICD-10-CM

## 2021-06-02 PROCEDURE — 99395 PREV VISIT EST AGE 18-39: CPT | Performed by: NURSE PRACTITIONER

## 2021-06-02 NOTE — PROGRESS NOTES
Subjective   Sarahi Mooney is a 40 y.o. female.   PMHX:Seasonal allergies and some mild anxiety.  PSHX:Reviewed current surgical hx and has nothing new  PFHX:Father has hypertension  Exercise:Normal daily exercise  Diet:Well balanced diet  Sleep hygiene:Good sleep hygiene  Employment status:Works at the [employer]      History of Present Illness   See above  The following portions of the patient's history were reviewed and updated as appropriate: allergies, current medications, past family history, past medical history, past social history, past surgical history and problem list.    Review of Systems   Constitutional: Negative for activity change, appetite change, chills and fever.   HENT: Negative for congestion.    Eyes: Negative for pain.   Respiratory: Negative for cough and shortness of breath.    Cardiovascular: Negative for chest pain and leg swelling.   Gastrointestinal: Negative for nausea and vomiting.   Genitourinary: Negative for difficulty urinating.   Skin: Negative for rash.   Neurological: Negative for dizziness, facial asymmetry and headache.       Objective   Physical Exam  Vitals and nursing note reviewed.   Constitutional:       General: She is not in acute distress.     Appearance: She is well-developed.   HENT:      Head: Normocephalic and atraumatic.      Right Ear: External ear normal.      Left Ear: External ear normal.   Neck:      Thyroid: No thyromegaly.   Cardiovascular:      Rate and Rhythm: Normal rate and regular rhythm.      Heart sounds: Normal heart sounds.   Pulmonary:      Effort: Pulmonary effort is normal.      Breath sounds: Normal breath sounds.   Musculoskeletal:         General: Normal range of motion.      Cervical back: Neck supple.   Skin:     General: Skin is warm.   Neurological:      Mental Status: She is alert.           Assessment/Plan     Physical exam  Reviewed recent labs  Preventative counseling for diet and exercise with patient at visit.  Pt reports that  they wear their seatbelt regularly.          Needs to return yearly does not need to come 6 months

## 2021-06-04 LAB
C TRACH RRNA CVX QL NAA+PROBE: NEGATIVE
CYTOLOGIST CVX/VAG CYTO: NORMAL
CYTOLOGY CVX/VAG DOC CYTO: NORMAL
CYTOLOGY CVX/VAG DOC THIN PREP: NORMAL
DX ICD CODE: NORMAL
HIV 1 & 2 AB SER-IMP: NORMAL
HPV I/H RISK 4 DNA CVX QL PROBE+SIG AMP: NEGATIVE
N GONORRHOEA RRNA CVX QL NAA+PROBE: NEGATIVE
OTHER STN SPEC: NORMAL
STAT OF ADQ CVX/VAG CYTO-IMP: NORMAL

## 2021-10-11 DIAGNOSIS — F41.9 ANXIETY: ICD-10-CM

## 2021-10-11 RX ORDER — ALPRAZOLAM 0.25 MG/1
0.25 TABLET ORAL 2 TIMES DAILY PRN
Qty: 40 TABLET | Refills: 0 | OUTPATIENT
Start: 2021-10-11

## 2021-10-14 ENCOUNTER — OFFICE VISIT (OUTPATIENT)
Dept: FAMILY MEDICINE CLINIC | Facility: CLINIC | Age: 41
End: 2021-10-14

## 2021-10-14 VITALS
DIASTOLIC BLOOD PRESSURE: 68 MMHG | BODY MASS INDEX: 22.4 KG/M2 | SYSTOLIC BLOOD PRESSURE: 124 MMHG | HEART RATE: 73 BPM | WEIGHT: 143 LBS | RESPIRATION RATE: 18 BRPM | OXYGEN SATURATION: 99 %

## 2021-10-14 DIAGNOSIS — G47.00 INSOMNIA, UNSPECIFIED TYPE: Primary | ICD-10-CM

## 2021-10-14 DIAGNOSIS — F41.9 ANXIETY: ICD-10-CM

## 2021-10-14 PROCEDURE — 99213 OFFICE O/P EST LOW 20 MIN: CPT | Performed by: NURSE PRACTITIONER

## 2021-10-14 RX ORDER — ALPRAZOLAM 0.25 MG/1
0.25 TABLET ORAL 2 TIMES DAILY PRN
Qty: 40 TABLET | Refills: 0 | Status: SHIPPED | OUTPATIENT
Start: 2021-10-14 | End: 2022-04-19 | Stop reason: SDUPTHER

## 2021-10-14 RX ORDER — AMITRIPTYLINE HYDROCHLORIDE 25 MG/1
25 TABLET, FILM COATED ORAL NIGHTLY
Qty: 30 TABLET | Refills: 1 | Status: SHIPPED | OUTPATIENT
Start: 2021-10-14 | End: 2022-03-14

## 2021-10-14 NOTE — PATIENT INSTRUCTIONS
"[email or web address]   Generalized Anxiety Disorder, Adult  Generalized anxiety disorder (JOCY) is a mental health condition. Unlike normal worries, anxiety related to JOCY is not triggered by a specific event. These worries do not fade or get better with time. JOCY interferes with relationships, work, and school.  JOCY symptoms can vary from mild to severe. People with severe JOCY can have intense waves of anxiety with physical symptoms that are similar to panic attacks.  What are the causes?  The exact cause of JOCY is not known, but the following are believed to have an impact:  · Differences in natural brain chemicals.  · Genes passed down from parents to children.  · Differences in the way threats are perceived.  · Development during childhood.  · Personality.  What increases the risk?  The following factors may make you more likely to develop this condition:  · Being female.  · Having a family history of anxiety disorders.  · Being very shy.  · Experiencing very stressful life events, such as the death of a loved one.  · Having a very stressful family environment.  What are the signs or symptoms?  People with JOCY often worry excessively about many things in their lives, such as their health and family. Symptoms may also include:  · Mental and emotional symptoms:  ? Worrying excessively about natural disasters.  ? Fear of being late.  ? Difficulty concentrating.  ? Fears that others are judging your performance.  · Physical symptoms:  ? Fatigue.  ? Headaches, muscle tension, muscle twitches, trembling, or feeling shaky.  ? Feeling like your heart is pounding or beating very fast.  ? Feeling out of breath or like you cannot take a deep breath.  ? Having trouble falling asleep or staying asleep, or experiencing restlessness.  ? Sweating.  ? Nausea, diarrhea, or irritable bowel syndrome (IBS).  · Behavioral symptoms:  ? Experiencing erratic moods or irritability.  ? Avoidance of new situations.  ? Avoidance of " people.  ? Extreme difficulty making decisions.  How is this diagnosed?  This condition is diagnosed based on your symptoms and medical history. You will also have a physical exam. Your health care provider may perform tests to rule out other possible causes of your symptoms.  To be diagnosed with JOCY, a person must have anxiety that:  · Is out of his or her control.  · Affects several different aspects of his or her life, such as work and relationships.  · Causes distress that makes him or her unable to take part in normal activities.  · Includes at least three symptoms of JOCY, such as restlessness, fatigue, trouble concentrating, irritability, muscle tension, or sleep problems.  Before your health care provider can confirm a diagnosis of JOCY, these symptoms must be present more days than they are not, and they must last for 6 months or longer.  How is this treated?  This condition may be treated with:  · Medicine. Antidepressant medicine is usually prescribed for long-term daily control. Anti-anxiety medicines may be added in severe cases, especially when panic attacks occur.  · Talk therapy (psychotherapy). Certain types of talk therapy can be helpful in treating JOCY by providing support, education, and guidance. Options include:  ? Cognitive behavioral therapy (CBT). People learn coping skills and self-calming techniques to ease their physical symptoms. They learn to identify unrealistic thoughts and behaviors and to replace them with more appropriate thoughts and behaviors.  ? Acceptance and commitment therapy (ACT). This treatment teaches people how to be mindful as a way to cope with unwanted thoughts and feelings.  ? Biofeedback. This process trains you to manage your body's response (physiological response) through breathing techniques and relaxation methods. You will work with a therapist while machines are used to monitor your physical symptoms.  · Stress management techniques. These include yoga,  meditation, and exercise.  A mental health specialist can help determine which treatment is best for you. Some people see improvement with one type of therapy. However, other people require a combination of therapies.  Follow these instructions at home:  Lifestyle  · Maintain a consistent routine and schedule.  · Anticipate stressful situations. Create a plan, and allow extra time to work with your plan.  · Practice stress management or self-calming techniques that you have learned from your therapist or your health care provider.  General instructions  · Take over-the-counter and prescription medicines only as told by your health care provider.  · Understand that you are likely to have setbacks. Accept this and be kind to yourself as you persist to take better care of yourself.  · Recognize and accept your accomplishments, even if you  them as small.  · Keep all follow-up visits as told by your health care provider. This is important.  Contact a health care provider if:  · Your symptoms do not get better.  · Your symptoms get worse.  · You have signs of depression, such as:  ? A persistently sad or irritable mood.  ? Loss of enjoyment in activities that used to bring you luisito.  ? Change in weight or eating.  ? Changes in sleeping habits.  ? Avoiding friends or family members.  ? Loss of energy for normal tasks.  ? Feelings of guilt or worthlessness.  Get help right away if:  · You have serious thoughts about hurting yourself or others.  If you ever feel like you may hurt yourself or others, or have thoughts about taking your own life, get help right away. Go to your nearest emergency department or:  · Call your local emergency services (841 in the [place]  · Call a suicide crisis helpline, such as the National Suicide Prevention Lifeline at 1-409.219.2554. This is open 24 hours a day in the [place]  · Text the Crisis Text Line at 060607 (in the [place]  Summary  · Generalized anxiety disorder (JOCY) is a mental  health condition that involves worry that is not triggered by a specific event.  · People with JOCY often worry excessively about many things in their lives, such as their health and family.  · JOCY may cause symptoms such as restlessness, trouble concentrating, sleep problems, frequent sweating, nausea, diarrhea, headaches, and trembling or muscle twitching.  · A mental health specialist can help determine which treatment is best for you. Some people see improvement with one type of therapy. However, other people require a combination of therapies.  This information is not intended to replace advice given to you by your health care provider. Make sure you discuss any questions you have with your health care provider.  Document Revised: 10/07/2020 Document Reviewed: 10/07/2020  Elsevier Patient Education © 2021 [employer]vier Inc.

## 2021-10-14 NOTE — PROGRESS NOTES
Subjective   Sarahi Mooney is a 41 y.o. female.     History of Present Illness  Anxiety and takes xanax  And reports  That it helps. She does not take it daily.She denies any SI or HI.She also has some intermittent insomnia that she tried taking some elavil for but it did not help.  The following portions of the patient's history were reviewed and updated as appropriate: allergies, current medications, past family history, past medical history, past social history, past surgical history and problem list.    Review of Systems   Constitutional: Negative for activity change, appetite change, fatigue and fever.   Psychiatric/Behavioral: Positive for sleep disturbance. The patient is nervous/anxious.        Objective   Physical Exam  Vitals and nursing note reviewed.   Constitutional:       General: She is not in acute distress.     Appearance: She is well-developed.   HENT:      Head: Normocephalic and atraumatic.      Right Ear: External ear normal.      Left Ear: External ear normal.   Neck:      Thyroid: No thyromegaly.   Cardiovascular:      Rate and Rhythm: Normal rate and regular rhythm.      Heart sounds: Normal heart sounds.   Pulmonary:      Effort: Pulmonary effort is normal.      Breath sounds: Normal breath sounds.   Musculoskeletal:         General: Normal range of motion.      Cervical back: Neck supple.   Skin:     General: Skin is warm.   Neurological:      Mental Status: She is alert.         Assessment/Plan   Diagnoses and all orders for this visit:    1. Insomnia, unspecified type (Primary)    2. Anxiety  -     ALPRAZolam (XANAX) 0.25 MG tablet; Take 1 tablet by mouth 2 (Two) Times a Day As Needed for Anxiety.  Dispense: 40 tablet; Refill: 0    Other orders  -     amitriptyline (ELAVIL) 25 MG tablet; Take 1 tablet by mouth Every Night.  Dispense: 30 tablet; Refill: 1       Emerson jose cruz  Follow up in 6 months

## 2022-03-14 ENCOUNTER — OFFICE VISIT (OUTPATIENT)
Dept: FAMILY MEDICINE CLINIC | Facility: CLINIC | Age: 42
End: 2022-03-14

## 2022-03-14 VITALS
SYSTOLIC BLOOD PRESSURE: 106 MMHG | HEART RATE: 61 BPM | OXYGEN SATURATION: 99 % | WEIGHT: 146 LBS | DIASTOLIC BLOOD PRESSURE: 68 MMHG | HEIGHT: 67 IN | BODY MASS INDEX: 22.91 KG/M2

## 2022-03-14 DIAGNOSIS — F41.9 ANXIETY: Primary | ICD-10-CM

## 2022-03-14 PROCEDURE — 99213 OFFICE O/P EST LOW 20 MIN: CPT | Performed by: NURSE PRACTITIONER

## 2022-03-14 RX ORDER — CETIRIZINE HYDROCHLORIDE 10 MG/1
10 TABLET ORAL DAILY
COMMUNITY

## 2022-03-14 NOTE — PROGRESS NOTES
Subjective   Sarahi Mooney is a 41 y.o. female.   anxiety    History of Present Illness   Has been under some extra stress with her job and would like a refill on her xanax. Her last prescription was dispensed on 10/14/2021 for 30 tablets. She uses them as needed.She has no SI or HI    The following portions of the patient's history were reviewed and updated as appropriate: allergies, current medications, past family history, past medical history, past social history, past surgical history and problem list.    Review of Systems   Constitutional: Negative for activity change, appetite change, fatigue and fever.   Psychiatric/Behavioral: Positive for stress. Negative for suicidal ideas. The patient is nervous/anxious.        Objective   Physical Exam  Vitals and nursing note reviewed.   Constitutional:       Appearance: She is well-developed.   HENT:      Head: Normocephalic and atraumatic.   Eyes:      Pupils: Pupils are equal, round, and reactive to light.   Pulmonary:      Effort: Pulmonary effort is normal.   Musculoskeletal:         General: Normal range of motion.   Skin:     General: Skin is warm and dry.   Neurological:      Mental Status: She is alert and oriented to person, place, and time.           Assessment/Plan   Problem List Items Addressed This Visit    None     Visit Diagnoses     Anxiety    -  Primary        It is too early for refill.  She does have some left from the prior prescription.  Will reorder on 4/17.  I also think is a good idea for her to see Shmuel and she agrees.       No follow-ups on file.

## 2022-03-14 NOTE — PATIENT INSTRUCTIONS
"[email or web address]   Generalized Anxiety Disorder, Adult  Generalized anxiety disorder (JOCY) is a mental health condition. Unlike normal worries, anxiety related to JOCY is not triggered by a specific event. These worries do not fade or get better with time. JOCY interferes with relationships, work, and school.  JOCY symptoms can vary from mild to severe. People with severe JOCY can have intense waves of anxiety with physical symptoms that are similar to panic attacks.  What are the causes?  The exact cause of JOCY is not known, but the following are believed to have an impact:  Differences in natural brain chemicals.  Genes passed down from parents to children.  Differences in the way threats are perceived.  Development during childhood.  Personality.  What increases the risk?  The following factors may make you more likely to develop this condition:  Being female.  Having a family history of anxiety disorders.  Being very shy.  Experiencing very stressful life events, such as the death of a loved one.  Having a very stressful family environment.  What are the signs or symptoms?  People with JOCY often worry excessively about many things in their lives, such as their health and family. Symptoms may also include:  Mental and emotional symptoms:  Worrying excessively about natural disasters.  Fear of being late.  Difficulty concentrating.  Fears that others are judging your performance.  Physical symptoms:  Fatigue.  Headaches, muscle tension, muscle twitches, trembling, or feeling shaky.  Feeling like your heart is pounding or beating very fast.  Feeling out of breath or like you cannot take a deep breath.  Having trouble falling asleep or staying asleep, or experiencing restlessness.  Sweating.  Nausea, diarrhea, or irritable bowel syndrome (IBS).  Behavioral symptoms:  Experiencing erratic moods or irritability.  Avoidance of new situations.  Avoidance of people.  Extreme difficulty making decisions.  How is this " diagnosed?  This condition is diagnosed based on your symptoms and medical history. You will also have a physical exam. Your health care provider may perform tests to rule out other possible causes of your symptoms.  To be diagnosed with JOCY, a person must have anxiety that:  Is out of his or her control.  Affects several different aspects of his or her life, such as work and relationships.  Causes distress that makes him or her unable to take part in normal activities.  Includes at least three symptoms of JOCY, such as restlessness, fatigue, trouble concentrating, irritability, muscle tension, or sleep problems.  Before your health care provider can confirm a diagnosis of JOCY, these symptoms must be present more days than they are not, and they must last for 6 months or longer.  How is this treated?  This condition may be treated with:  Medicine. Antidepressant medicine is usually prescribed for long-term daily control. Anti-anxiety medicines may be added in severe cases, especially when panic attacks occur.  Talk therapy (psychotherapy). Certain types of talk therapy can be helpful in treating JOCY by providing support, education, and guidance. Options include:  Cognitive behavioral therapy (CBT). People learn coping skills and self-calming techniques to ease their physical symptoms. They learn to identify unrealistic thoughts and behaviors and to replace them with more appropriate thoughts and behaviors.  Acceptance and commitment therapy (ACT). This treatment teaches people how to be mindful as a way to cope with unwanted thoughts and feelings.  Biofeedback. This process trains you to manage your body's response (physiological response) through breathing techniques and relaxation methods. You will work with a therapist while machines are used to monitor your physical symptoms.  Stress management techniques. These include yoga, meditation, and exercise.  A mental health specialist can help determine which treatment  is best for you. Some people see improvement with one type of therapy. However, other people require a combination of therapies.  Follow these instructions at home:  Lifestyle  Maintain a consistent routine and schedule.  Anticipate stressful situations. Create a plan, and allow extra time to work with your plan.  Practice stress management or self-calming techniques that you have learned from your therapist or your health care provider.  General instructions  Take over-the-counter and prescription medicines only as told by your health care provider.  Understand that you are likely to have setbacks. Accept this and be kind to yourself as you persist to take better care of yourself.  Recognize and accept your accomplishments, even if you  them as small.  Keep all follow-up visits as told by your health care provider. This is important.  Contact a health care provider if:  Your symptoms do not get better.  Your symptoms get worse.  You have signs of depression, such as:  A persistently sad or irritable mood.  Loss of enjoyment in activities that used to bring you luisito.  Change in weight or eating.  Changes in sleeping habits.  Avoiding friends or family members.  Loss of energy for normal tasks.  Feelings of guilt or worthlessness.  Get help right away if:  You have serious thoughts about hurting yourself or others.  If you ever feel like you may hurt yourself or others, or have thoughts about taking your own life, get help right away. Go to your nearest emergency department or:  Call your local emergency services (128 in the [place]  Call a suicide crisis helpline, such as the National Suicide Prevention Lifeline at 1-966.527.1328. This is open 24 hours a day in the [place]  Text the Crisis Text Line at 566117 (in the [place]  Summary  Generalized anxiety disorder (JOCY) is a mental health condition that involves worry that is not triggered by a specific event.  People with JOCY often worry excessively about many things  in their lives, such as their health and family.  JOCY may cause symptoms such as restlessness, trouble concentrating, sleep problems, frequent sweating, nausea, diarrhea, headaches, and trembling or muscle twitching.  A mental health specialist can help determine which treatment is best for you. Some people see improvement with one type of therapy. However, other people require a combination of therapies.  This information is not intended to replace advice given to you by your health care provider. Make sure you discuss any questions you have with your health care provider.  Document Revised: 10/07/2020 Document Reviewed: 10/07/2020  Elsevier Patient Education © 2021 [employer].

## 2022-04-19 DIAGNOSIS — F41.9 ANXIETY: ICD-10-CM

## 2022-04-19 RX ORDER — ALPRAZOLAM 0.25 MG/1
0.25 TABLET ORAL 2 TIMES DAILY PRN
Qty: 30 TABLET | Refills: 0 | Status: SHIPPED | OUTPATIENT
Start: 2022-04-19 | End: 2022-06-07 | Stop reason: SDUPTHER

## 2022-04-19 RX ORDER — ALPRAZOLAM 0.25 MG/1
0.25 TABLET ORAL 2 TIMES DAILY PRN
Qty: 40 TABLET | Refills: 0 | Status: CANCELLED | OUTPATIENT
Start: 2022-04-19

## 2022-04-19 NOTE — TELEPHONE ENCOUNTER
Caller: Vinicio Sarahi N    Relationship: Self    Best call back number: [phone]      Requested Prescriptions:   Requested Prescriptions     Pending Prescriptions Disp Refills   • ALPRAZolam (XANAX) 0.25 MG tablet 40 tablet 0     Sig: Take 1 tablet by mouth 2 (Two) Times a Day As Needed for Anxiety.        Pharmacy where request should be sent: [facility] [address] AT [address] [place] [address] [phone] [place] [phone] FX     Additional details provided by patient: HAS LESS THAN 3 DAYS. SHE STATES THAT [employer] WAS GOING TO GIVE HER MORE THAN USUAL BECAUSE SHE IS RETIRING AND WANTED TO MAKE SURE SHE WAS COVERED UNTIL SHE FINDS NEW COVERAGE.    Does the patient have less than a 3 day supply:  [x] Yes  [] No    Yana Lewis, PCT   04/19/22 12:18 EDT

## 2022-05-20 ENCOUNTER — TELEPHONE (OUTPATIENT)
Dept: FAMILY MEDICINE CLINIC | Facility: CLINIC | Age: 42
End: 2022-05-20

## 2022-05-20 DIAGNOSIS — R53.83 FATIGUE, UNSPECIFIED TYPE: ICD-10-CM

## 2022-05-20 DIAGNOSIS — E78.00 ELEVATED CHOLESTEROL: Primary | ICD-10-CM

## 2022-05-20 DIAGNOSIS — Z11.1 SCREENING-PULMONARY TB: ICD-10-CM

## 2022-05-20 NOTE — TELEPHONE ENCOUNTER
THE PATIENT WOULD LIKE ROUTINE LABS ORDERED FOR HER UPCOMING PHYSICAL. SPECIFICALLY, THE PATIENT WOULD ALSO LIKE A BLOOD ORDERED TO TEST FOR TUBERCULOSIS SINCE SHE HAD EXPOSURE IN THE PAST.    PLEASE FOLLOW-UP TO SCHEDULE BY CALLING [phone].

## 2022-06-07 ENCOUNTER — OFFICE VISIT (OUTPATIENT)
Dept: FAMILY MEDICINE CLINIC | Facility: CLINIC | Age: 42
End: 2022-06-07

## 2022-06-07 VITALS
WEIGHT: 149 LBS | OXYGEN SATURATION: 99 % | DIASTOLIC BLOOD PRESSURE: 78 MMHG | SYSTOLIC BLOOD PRESSURE: 112 MMHG | HEART RATE: 68 BPM | BODY MASS INDEX: 23.39 KG/M2 | HEIGHT: 67 IN

## 2022-06-07 DIAGNOSIS — Z13.228 ENCOUNTER FOR SCREENING FOR OTHER METABOLIC DISORDERS: ICD-10-CM

## 2022-06-07 DIAGNOSIS — F41.9 ANXIETY: ICD-10-CM

## 2022-06-07 DIAGNOSIS — Z00.00 ANNUAL PHYSICAL EXAM: Primary | ICD-10-CM

## 2022-06-07 DIAGNOSIS — Z13.220 SCREENING FOR HYPERLIPIDEMIA: Primary | ICD-10-CM

## 2022-06-07 DIAGNOSIS — L40.9 PSORIASIS OF SCALP: ICD-10-CM

## 2022-06-07 PROCEDURE — 99395 PREV VISIT EST AGE 18-39: CPT | Performed by: NURSE PRACTITIONER

## 2022-06-07 RX ORDER — ALPRAZOLAM 0.25 MG/1
0.25 TABLET ORAL 2 TIMES DAILY PRN
Qty: 60 TABLET | Refills: 0 | Status: SHIPPED | OUTPATIENT
Start: 2022-06-07

## 2022-06-07 NOTE — PROGRESS NOTES
Preventive Exam    History of Present Illness: Sarahi Mooney is a 41 y.o. here for check up and review of routine health maintenance. she states she is doing well and has the following concerns:    Patient is being seen for anxiety, ongoing. She has been taking xanax as needed for anxiety since the death of her  two years ago. She reports that she takes this sparingly, but needs it when her anxiety is high.  I reviewed her OLGA and reviewed controlled substance agreement at visit.    Patient also has incidental finding of psoriasis on her back left scalp. She reports that she will seek a dermatologist for treatment of this.     Past medical history, surgical history and family history have been reviewed.     Review of Systems   Constitutional: Negative for appetite change, chills, fatigue, fever, unexpected weight gain and unexpected weight loss.   HENT: Negative for congestion, dental problem, facial swelling, nosebleeds, postnasal drip, sinus pressure and sore throat.         Dental exam is up to date.    Eyes: Negative.  Negative for blurred vision, double vision and photophobia.        Wears glasses. Eye exam is up to date.    Respiratory: Negative for cough, chest tightness, shortness of breath and wheezing.    Cardiovascular: Negative for chest pain, palpitations and leg swelling.   Gastrointestinal: Positive for diarrhea. Negative for abdominal pain, constipation, nausea, vomiting and GERD.   Endocrine: Negative.  Negative for cold intolerance and heat intolerance.   Genitourinary: Negative.  Negative for breast discharge, breast lump, breast pain, dysuria, flank pain, menstrual problem, pelvic pain, pelvic pressure, vaginal bleeding and vaginal discharge.   Musculoskeletal: Negative for arthralgias, back pain, joint swelling and myalgias.   Skin: Negative.         Dry itchy patch to back of her head   Allergic/Immunologic: Negative.  Negative for environmental allergies and food allergies.    Neurological: Negative for dizziness, weakness, numbness and headache.   Hematological: Negative.  Does not bruise/bleed easily.   Psychiatric/Behavioral: Positive for sleep disturbance and depressed mood (grief related). Negative for behavioral problems, dysphoric mood, self-injury and suicidal ideas. The patient is not nervous/anxious.         Patient reports that depression is grief related, but controlled. She denies any suicidal thoughts or planning.        PHYSICAL EXAM    Vitals:    06/07/22 1538   BP: 112/78   Pulse: 68   SpO2: 99%   Body mass index is 23.34 kg/m².      Physical Exam  Vitals and nursing note reviewed.   Constitutional:       Appearance: She is well-developed and normal weight.   HENT:      Head: Normocephalic and atraumatic.      Right Ear: Tympanic membrane, ear canal and external ear normal.      Left Ear: Tympanic membrane, ear canal and external ear normal.      Nose: Nose normal.      Mouth/Throat:      Lips: Pink.      Mouth: Mucous membranes are moist.      Tongue: No lesions.      Palate: No mass and lesions.      Pharynx: Oropharynx is clear. Uvula midline.      Tonsils: No tonsillar exudate.   Eyes:      Conjunctiva/sclera: Conjunctivae normal.      Pupils: Pupils are equal, round, and reactive to light.   Neck:      Thyroid: No thyromegaly.   Cardiovascular:      Rate and Rhythm: Normal rate and regular rhythm.      Pulses: Normal pulses.           Dorsalis pedis pulses are 2+ on the right side and 2+ on the left side.        Posterior tibial pulses are 2+ on the right side and 2+ on the left side.      Heart sounds: Normal heart sounds. No murmur heard.  Pulmonary:      Effort: Pulmonary effort is normal.      Breath sounds: Normal breath sounds.   Chest:   Breasts:      Right: No supraclavicular adenopathy.      Left: No supraclavicular adenopathy.       Abdominal:      General: Bowel sounds are normal. There is no distension.      Palpations: Abdomen is soft.      Tenderness:  There is no abdominal tenderness.   Musculoskeletal:         General: No deformity. Normal range of motion.      Cervical back: Normal range of motion and neck supple.      Right lower leg: No edema.      Left lower leg: No edema.   Lymphadenopathy:      Head:      Right side of head: No submental, submandibular, tonsillar, preauricular, posterior auricular or occipital adenopathy.      Left side of head: No submental, submandibular, tonsillar, preauricular, posterior auricular or occipital adenopathy.      Cervical: No cervical adenopathy.      Right cervical: No superficial, deep or posterior cervical adenopathy.     Left cervical: No superficial, deep or posterior cervical adenopathy.      Upper Body:      Right upper body: No supraclavicular adenopathy.      Left upper body: No supraclavicular adenopathy.   Skin:     General: Skin is warm and dry.      Capillary Refill: Capillary refill takes 2 to 3 seconds.   Neurological:      General: No focal deficit present.      Mental Status: She is alert and oriented to person, place, and time.      Cranial Nerves: Cranial nerves are intact. No cranial nerve deficit.      Sensory: Sensation is intact.      Motor: Motor function is intact.      Coordination: Coordination is intact.      Gait: Gait is intact.   Psychiatric:         Attention and Perception: Attention and perception normal.         Mood and Affect: Mood normal.         Speech: Speech normal.         Behavior: Behavior normal. Behavior is cooperative.         Thought Content: Thought content normal.         Cognition and Memory: Cognition and memory normal.         Judgment: Judgment normal.         Procedures    Diagnoses and all orders for this visit:    1. Annual physical exam (Primary)    2. Anxiety  -     ALPRAZolam (XANAX) 0.25 MG tablet; Take 1 tablet by mouth 2 (Two) Times a Day As Needed for Anxiety.  Dispense: 60 tablet; Refill: 0        Problems Addressed this Visit    None     Visit Diagnoses      Annual physical exam    -  Primary    Anxiety        Relevant Medications    ALPRAZolam (XANAX) 0.25 MG tablet      Diagnoses       Codes Comments    Annual physical exam    -  Primary ICD-10-CM: Z00.00  ICD-9-CM: V70.0     Anxiety     ICD-10-CM: F41.9  ICD-9-CM: 300.00       Refilled Xanax   Renewed controlled substance agreement  OLGA reviewed  Reviewed recent labs with patient.   Routine health maintenance reviewed and discussed with Sarahi Mooney.    Preventative counseling regarding healthy diet and exercise.   Pt reports that he wears a seatbelt regularly.  Return in about 1 year (around 6/7/2023) for Recheck.

## 2023-08-15 NOTE — PROGRESS NOTES
[facility] PROGRESS NOTE  [facility]  [address], [place] [address]    NAME: Sarahi Mooney     : 1980   MRN: [id number]     Patient Care Team:  Jailene Coon APRN as PCP - General (Nurse Practitioner)    DATE: 2023    -Patient was referred by: [] SELF  [x] [employer] provider  -Psychiatric history packet turned in/reviewed : [x] Yes [] No    Subjective     Chief Complaint   Patient presents with    Anxiety      HPI:  43 y.o. female patient seen for the first time today for initial evaluation.  Patient was seen in  by PCP Shaggy and referral was placed to myself for anxiety due to patient requesting to be put on a long-acting benzo, contributing issues leading up to today include  passing away roughly 3 years ago, and patient being a single mother of three. Has done grief counseling, last was in .  Patient reports she is  at a addiction recovery center, reports multiple locations all over [place], patient reports she is well aware of the risk of being prescribed a benzodiazepine, patient reports she was irritated that she had to see me today.  Patient reports sleep disturbance, gets on average around 5 hours at night, denies snoring, reports she has never been able to sleep good, takes over-the-counter Unisom on/off, trazodone discussed.    Patient reports her Xanax prescription would be filled a handful of times a year, reports in the past month she has taken medication twice.    Initial S/S reported:  MOOD ENERGY  APPETITE/WEIGHT SLEEP   [] Sadness    [] Tearfulness    [] Feeling empty    [] Suicidal thoughts  [x] Anxiety   [] Fear    [] Panic attacks    [] Irritability    [] Anger    [] Guilt    [] Social anxiety    [] Elevated mood    [] Mood swings    [] Self-harming   [] Too much  [] Too little   [] Increased appetite   [] Decreased appetite   [] Increased weight   [] Decreased weight   [] Restrictive dieting   [] Over-exercising    [] Binge-eating   [] Purging   [] Taking laxatives    [x] Problems falling asleep   [x] Problems staying asleep    [x] Waking in the early morning    [] Nightmares    [] Waking in panic   [] Sleeping too much  [x] Sleeping too little      IMPULSIVITY  CONCENTRATION & FOCUS   MOTIVATION & INTEREST     [] Impulsive spending   [] Putting self in danger  [] Interrupting others   [] Cannot wait turn    [] Cannot start/stick with/complete   [] Difficulties concentrating  [] Mind is racing   [] Procrastinating  [] Little/no luisito in pleasurable things   [] No drive to accomplish tasks         PRIOR PSYCH MEDICATIONS:  Celexa  Patient reports she does not want to be on a daily medication like an SSRI or an antipsychotic, Remeron offered, patient declined.    PRIOR PSYCH DX: Anxiety    PRIOR MENTAL HEALTH PROVIDERS: None reported    PSYCH ADMISSIONS: Denies    SELF HARM/SUICIDALLY: Denies    TRAUMA/ABUSE: Death of  2020    SOCIAL HX: Patient was born and raised in [place], has lived in [place] since 2006, is a  of an addiction center, has 3 children.    SUBSTANCES: Denies history of drug or alcohol abuse or treatment thereof.    Social History     Occupational History    Not on file   Tobacco Use    Smoking status: Never    Smokeless tobacco: Never   Vaping Use    Vaping Use: Never used   Substance and Sexual Activity    Alcohol use: Not Currently     Comment: rarely    Drug use: No    Sexual activity: Yes     Partners: Male     Birth control/protection: I.U.D.     Family History   Problem Relation Age of Onset    Hypertension Father     Alcohol abuse Father     Other Paternal Aunt         Thyroid    Coronary artery disease Paternal Grandfather 55    Stroke Other 75      Past Medical History:   Diagnosis Date    Asthma     controlled with advair bid, albuterol prn    JOCY (generalized anxiety disorder) 8/17/2023    HSV-1 infection     valtrex prn    Latent tuberculosis     was treated by health dept  "    Past Surgical History:   Procedure Laterality Date    BREAST SURGERY      COSMETIC SURGERY      HERNIA REPAIR      PAP SMEAR  08/2012    (gyn)      Review of Systems   Constitutional: Negative.    Respiratory:  Negative for chest tightness and shortness of breath.    Cardiovascular:  Negative for chest pain.   Gastrointestinal:  Positive for nausea.   Neurological:  Negative for dizziness and light-headedness.   Psychiatric/Behavioral:  Positive for sleep disturbance and stress. Negative for suicidal ideas. The patient is nervous/anxious.      Objective   Physical Exam  Constitutional:       Appearance: Normal appearance.   Cardiovascular:      Rate and Rhythm: Normal rate.   Pulmonary:      Effort: Pulmonary effort is normal.   Neurological:      General: No focal deficit present.      Mental Status: She is alert and oriented to person, place, and time.   Psychiatric:         Mood and Affect: Mood normal.         Behavior: Behavior normal.         Thought Content: Thought content normal.     /70   Pulse 59   Resp 16   Ht 170.2 cm (67\")   Wt 66.2 kg (146 lb)   SpO2 99%   BMI 22.87 kg/mý   No LMP recorded. Patient has had an implant.    PHQ-9 Depression Screening  Little interest or pleasure in doing things? 0-->not at all   Feeling down, depressed, or hopeless? 0-->not at all   Trouble falling or staying asleep, or sleeping too much? 2-->more than half the days   Feeling tired or having little energy?     Poor appetite or overeating? 0-->not at all   Feeling bad about yourself/you are a failure/have let yourself or your family down? 0-->not at all   Trouble concentrating on things? 0-->not at all   Psychomotor agitation/retardation 0-->not at all   Thoughts about death/dying/suicide 0-->not at all   PHQ-9 Total Score 2   How difficult have these problems for you? somewhat difficult     GAD7 Documentation:  Feeling nervous, anxious or on edge 1   Not being able to stop or control worrying 1   Worrying " too much about different things 1   Trouble relaxing 1   Being so restless that it is hard to sit still 1   Becoming easily annoyed or irritable 1   Feeling Afraid as if something awful might happen 0   JOCY Total Score 6   How difficult have these problems made it for you? Somewhat difficult     MENTAL STATUS EXAM   General Appearance:  Cleanly groomed and dressed  Eye Contact:  Good eye contact  Attitude:  Candid  Motor Activity:  Normal gait, posture  Speech:  Normal rate, tone, volume  Mood and affect:  Irritable  Thought Process:  Goal-directed  Associations/ Thought Content:  No delusions  Hallucinations:  None  Suicidal Ideations:  Not present  Homicidal Ideation:  Not present  Orientation:  Person, place, time and situation  Fund of Knowledge:  Appropriate for age and educational level  Intellectual Functioning:  Average range  Insight:  Fair  Judgement:  Fair  Reliability:  Fair  Impulse Control:  Fair     LABS:  Common labs          6/30/2023    12:04   Common Labs   Glucose 88    BUN 14    Creatinine 0.91    Sodium 140    Potassium 4.2    Chloride 103    Calcium 9.2    Total Protein 6.8    Albumin 4.7    Total Bilirubin 0.4    Alkaline Phosphatase 47    AST (SGOT) 20    ALT (SGPT) 17    WBC 7.7    Hemoglobin 13.6    Hematocrit 39.4    Platelets 315    Total Cholesterol 229    Triglycerides 52    HDL Cholesterol 79    LDL Cholesterol  141       ALLERGY:  No Known Allergies     Current Outpatient Medications on File Prior to Visit   Medication Sig    albuterol sulfate  (90 Base) MCG/ACT inhaler Inhale 2 puffs Every 4 (Four) Hours As Needed for Wheezing.    cetirizine (zyrTEC) 10 MG tablet Take 1 tablet by mouth Daily.    Levonorgestrel (MIRENA) 20 MCG/DAY intrauterine device IUD 1 each by Intrauterine route.    Magnesium Gluconate (MAGNESIUM 27 PO) Take  by mouth.    minocycline (DYNACIN) 50 MG tablet Take 1 tablet by mouth 2 (Two) Times a Day.    Probiotic Product (PROBIOTIC PO) Take  by mouth.     [DISCONTINUED] ALPRAZolam (XANAX) 0.25 MG tablet Take 1 tablet by mouth 2 (Two) Times a Day As Needed for Anxiety.     No current facility-administered medications on file prior to visit.        Assessment    ASSESSMENT/TREATMENT PLAN/INSTRUCTIONS/EDUCATION    (F41.1) JOCY (generalized anxiety disorder) - Plan: clonazePAM (KlonoPIN) 0.5 MG tablet    (Z79.899) High risk medication use    (G47.00) Insomnia, unspecified type     -The above listed condition/conditions are newly identified to this provider.    AFTER VISIT SUMMARY INSTRUCTIONS:    Medication changes: Discontinue Xanax.  Okay to start Klonopin 0.5 mg up to twice a day as needed for anxiety, patient educated this will be filled 2-3 times a year at the most, patient has been educated I do not write daily long-term use of benzodiazepines due to risk of addiction.  Patient educated I can send prescription to pharmacy in [place] for time being but additional refills need to go to a [place] pharmacy due to prescribers BILL license.  Emerson reviewed, narcotics contract signed  Raymond offered, patient not interested, consider trazodone in the future for sleep.    Please call the office at [phone] with any worsening of symptoms or onset of intolerable side effects. Patient is agreeable to call 911 or go to the nearest ER should he/she/they have any thoughts of harm to self or others.  Patient has been educated regarding multimodal approach with healthy nutrition, healthy sleep, regular physical activity, social activities, counseling, and medications.   Patient has been educated on the risk versus benefit of being prescribed a controlled substance, patient has been educated on additional monitoring that is required including Emerson reports, random urine drug screens, and pill counts.  Narcotics contract has been read/signed by patient and provider, renewed yearly.  Scripts must be sent to a [place] pharmacy.    Return in 1 month (on 9/17/2023) for Medication Check, Video  visit.    Future Appointments         Provider Department Center    9/22/2023 10:00 AM Shmuel Adame APRN [facility] [place]             Medications Discontinued During This Encounter   Medication Reason    ALPRAZolam (XANAX) 0.25 MG tablet Historical Med - Therapy completed     New Medications Ordered This Visit   Medications    clonazePAM (KlonoPIN) 0.5 MG tablet     Sig: Take 1 tablet by mouth 2 (Two) Times a Day As Needed for Anxiety.     Dispense:  60 tablet     Refill:  0      No orders of the defined types were placed in this encounter.    ADDITIONAL MONITORING:  -Narc Contract on file: 8/23  -OLGA LAZARO 3M minimum scanned into EMR: 8/23  -UDS: random     -Barriers:  not interested in daily anxiety medication, stress, and low support  -Strengths:  self-reliant    -Short-Term Goals: Pt will be compliant with medication management and note improvement in S/S over the next 4 to 6 weeks or at next scheduled visit.  -Long-Term Goals: Pt will be compliant with the agreed treatment plan including medication regimen & F/U appt's and deny impairment in daily functioning over the next 6 months.      -Progress toward goal: Not at goal  -Functional Status: Moderate impairment   -Prognosis: Fair with Ongoing Treatment        SUMMARY/DISCUSSION:  Pt past social/medical/family history reviewed/updated. ROS conducted, medications/allergies, reviewed, patient was screened today for depression/anxiety, PHQ/JOCY scores reviewed.  Most recent vitals/labs reviewed. Pt was given appropriate time to ask questions and concerns were addressed. A thorough discussion was had that included review of disease process, need for continued monitoring and additional treatment options including use of pharmacological and non-pharmacological approaches to care, decisions were made and agreed upon by patient and provider. Discussed the risks, benefits, and potential side effects of the medications; patient  ackowledged and verbally consented.     Part of this note may be an electronic transcription/translation of spoken language to printed text using the Dragon Dictation System. Some of the data in this electronic note has been brought forward from a previous encounter, any necessary changes have been made, it has been reviewed by this APRN, and it is accurate.    This document has been electronically signed by OLGA Arzola August 17, 2023 09:52 EDT

## 2023-08-17 ENCOUNTER — OFFICE VISIT (OUTPATIENT)
Dept: BEHAVIORAL HEALTH | Facility: CLINIC | Age: 43
End: 2023-08-17
Payer: COMMERCIAL

## 2023-08-17 VITALS
DIASTOLIC BLOOD PRESSURE: 70 MMHG | HEIGHT: 67 IN | WEIGHT: 146 LBS | HEART RATE: 59 BPM | OXYGEN SATURATION: 99 % | BODY MASS INDEX: 22.91 KG/M2 | SYSTOLIC BLOOD PRESSURE: 108 MMHG | RESPIRATION RATE: 16 BRPM

## 2023-08-17 DIAGNOSIS — G47.00 INSOMNIA, UNSPECIFIED TYPE: ICD-10-CM

## 2023-08-17 DIAGNOSIS — Z79.899 HIGH RISK MEDICATION USE: ICD-10-CM

## 2023-08-17 DIAGNOSIS — F41.1 GAD (GENERALIZED ANXIETY DISORDER): Primary | ICD-10-CM

## 2023-08-17 RX ORDER — CLONAZEPAM 0.5 MG/1
0.5 TABLET ORAL 2 TIMES DAILY PRN
Qty: 60 TABLET | Refills: 0 | Status: SHIPPED | OUTPATIENT
Start: 2023-08-17

## 2023-08-17 NOTE — PATIENT INSTRUCTIONS
Medication changes: Discontinue Xanax.  Okay to start Klonopin 0.5 mg up to twice a day as needed for anxiety, patient educated this will be filled 2-3 times a year at the most, patient has been educated I do not write daily long-term use of benzodiazepines due to risk of addiction.  Patient educated I can send prescription to pharmacy in [place] for time being but additional refills need to go to a [place] pharmacy due to prescribers BILL license.  Emerson reviewed, narcotics contract signed  Raymond offered, patient not interested, consider trazodone in the future for sleep.    Please call the office at [phone] with any worsening of symptoms or onset of intolerable side effects. Patient is agreeable to call 911 or go to the nearest ER should he/she/they have any thoughts of harm to self or others.  Patient has been educated regarding multimodal approach with healthy nutrition, healthy sleep, regular physical activity, social activities, counseling, and medications.   Patient has been educated on the risk versus benefit of being prescribed a controlled substance, patient has been educated on additional monitoring that is required including Emerson reports, random urine drug screens, and pill counts.  Narcotics contract has been read/signed by patient and provider, renewed yearly.  Scripts must be sent to a [place] pharmacy.

## 2023-09-22 ENCOUNTER — TELEMEDICINE (OUTPATIENT)
Dept: BEHAVIORAL HEALTH | Facility: CLINIC | Age: 43
End: 2023-09-22
Payer: COMMERCIAL

## 2023-09-22 DIAGNOSIS — Z79.899 HIGH RISK MEDICATION USE: ICD-10-CM

## 2023-09-22 DIAGNOSIS — F41.1 GAD (GENERALIZED ANXIETY DISORDER): Primary | ICD-10-CM

## 2023-09-22 RX ORDER — ALPRAZOLAM 0.25 MG/1
0.25 TABLET ORAL 2 TIMES DAILY PRN
Qty: 60 TABLET | Refills: 1 | Status: SHIPPED | OUTPATIENT
Start: 2023-09-22

## 2023-09-22 NOTE — PATIENT INSTRUCTIONS
Medication changes:   Discontinue Klonopin, okay to start Xanax 0.25 mg up to twice a day as needed for acute anxiety, not for daily use.

## 2023-09-22 NOTE — PROGRESS NOTES
Patient assessed today via MyChart through EPIC pt is at home in a secure environment and verbalizes privacy during interview. EMILY Jose is at home in a secure environment using a secure laptop.The patient's condition being diagnosed/treated is appropriate for telemedicine.The provider identified himself as well as his credentials.The patient, and/or patient's guardian, consent to be seen remotely, and when consent is given they understand that the consent allows for patient identifiable information to be sent to a third party as needed. They may refuse to be seen remotely at any time. The electronic data is encrypted and password protected, and the patient and/or guardian has been advised of the potential risks to privacy not withstanding such measures.    [facility] PROGRESS NOTE  [facility]  [address] [place] [address]    NAME: Sarahi Mooney     : 1980   MRN: [id number]   PCP: Jailene Coon APRN     DATE: 2023    ALLERGY:Patient has no known allergies.     MEDICATIONS:  albuterol sulfate HFA  ALPRAZolam  cetirizine  Levonorgestrel intrauterine device  MAGNESIUM 27 PO  minocycline  PROBIOTIC PO     VITALS: There were no vitals taken for this visit. No LMP recorded. Patient has had an implant.     Subjective     Chief Complaint   Patient presents with    Anxiety    Follow-up      HPI:  43 y.o. female patient seen for follow up.  Patient seen for the first time roughly 1 month ago requesting a longer acting benzo to be used on a as needed basis, was previously prescribed Xanax, this was changed to Klonopin, since that time patient reports not liking the Klonopin took for a handful of times, reported excessive irritability, and a terrible mood, patient inquiring about as needed Valium, I recommended Ativan or just switching back to Xanax.     Social Status:  Ethnic Group: Not  Or   Race: White Or   Marital Status:    Employment  status: Full Time [employer]       SUBSTANCE/SEXUAL HISTORY:   reports that she has never smoked. She has never used smokeless tobacco. She reports that she does not currently use alcohol. She reports that she does not use drugs.   reports being sexually active and has had partner(s) who are male. She reports using the following method of birth control/protection: I.U.D..      FAMILY HISTORY:  family history includes Alcohol abuse in her father; Coronary artery disease (age of onset: 55) in her paternal grandfather; Hypertension in her father; Other in her paternal aunt; Stroke (age of onset: 75) in an other family member.     PAST MEDICAL HISTORY   has a past medical history of Asthma, JOCY (generalized anxiety disorder) (8/17/2023), HSV-1 infection, and Latent tuberculosis.    She has no past medical history of ADHD (attention deficit hyperactivity disorder), Alcohol abuse, Alcoholism, Anorexia nervosa, Autism spectrum disorder, Bipolar disorder, Borderline personality disorder, Bulimia nervosa, Cancer, Chronic pain disorder, Depression, Disease of thyroid gland, Head injury, HIV disease, Liver disease, Obsessive-compulsive disorder, Oppositional defiant disorder, Panic disorder, Peripheral neuropathy, Psychosis, PTSD (post-traumatic stress disorder), Schizoaffective disorder, Seizures, Self-injurious behavior, Substance abuse, Suicide attempt, Violence, history of, Withdrawal symptoms, alcohol, or Withdrawal symptoms, drug or narcotic.     Review of Systems   Constitutional: Negative.  Negative for chills and fever.   Respiratory:  Negative for chest tightness and shortness of breath.    Cardiovascular:  Negative for chest pain.   Gastrointestinal:  Negative for nausea and vomiting.   Neurological:  Negative for dizziness and light-headedness.   Psychiatric/Behavioral:  Positive for sleep disturbance and stress. Negative for suicidal ideas. The patient is nervous/anxious.      Objective   Physical  Exam  Constitutional:       Appearance: Normal appearance.   HENT:      Head: Normocephalic.   Pulmonary:      Effort: Pulmonary effort is normal.   Skin:     General: Skin is dry.   Neurological:      General: No focal deficit present.      Mental Status: She is alert and oriented to person, place, and time.   Psychiatric:         Mood and Affect: Mood normal.         Behavior: Behavior normal.         Thought Content: Thought content normal.     PHQ-9 Depression Screening  Little interest or pleasure in doing things? (P) 0-->not at all   Feeling down, depressed, or hopeless? (P) 0-->not at all   Trouble falling or staying asleep, or sleeping too much? (P) 1-->several days   Feeling tired or having little energy?     Poor appetite or overeating? (P) 0-->not at all   Feeling bad about yourself/you are a failure/have let yourself or your family down? (P) 1-->several days   Trouble concentrating on things? (P) 1-->several days   Psychomotor agitation/retardation (P) 0-->not at all   Thoughts about death/dying/suicide (P) 0-->not at all   PHQ-9 Total Score (P) 4   How difficult have these problems for you? (P) somewhat difficult     GAD7 Documentation:  Feeling nervous, anxious or on edge (P) 1   Not being able to stop or control worrying (P) 1   Worrying too much about different things (P) 1   Trouble relaxing (P) 1   Being so restless that it is hard to sit still (P) 1   Becoming easily annoyed or irritable (P) 1   Feeling Afraid as if something awful might happen (P) 0   JOCY Total Score (P) 6   How difficult have these problems made it for you? (P) Somewhat difficult     MENTAL STATUS EXAM   General Appearance:  Cleanly groomed and dressed  Eye Contact:  Good eye contact  Attitude:  Cooperative  Motor Activity:  Normal gait, posture  Speech:  Normal rate, tone, volume  Mood and affect:  Irritable  Thought Process:  Goal-directed  Associations/ Thought Content:  No delusions  Hallucinations:  None  Suicidal  Ideations:  Not present  Homicidal Ideation:  Not present  Sensorium:  Alert  Orientation:  Person, place, time and situation  Fund of Knowledge:  Appropriate for age and educational level  Intellectual Functioning:  Average range  Insight:  Fair  Judgement:  Fair  Reliability:  Fair  Impulse Control:  Fair     LABS:  CBC          6/30/2023    12:04   CBC   WBC 7.7    RBC 4.08    Hemoglobin 13.6    Hematocrit 39.4    MCV 97    MCH 33.3    MCHC 34.5    RDW 11.7    Platelets 315       CMP          6/30/2023    12:04   CMP   Glucose 88    BUN 14    Creatinine 0.91    Sodium 140    Potassium 4.2    Chloride 103    Calcium 9.2    Total Protein 6.8    Albumin 4.7    Globulin 2.1    Total Bilirubin 0.4    Alkaline Phosphatase 47    AST (SGOT) 20    ALT (SGPT) 17    BUN/Creatinine Ratio 15         Assessment    ASSESSMENT/TREATMENT PLAN/INSTRUCTIONS/EDUCATION    (F41.1) JOCY (generalized anxiety disorder) - Plan: ALPRAZolam (Xanax) 0.25 MG tablet    (Z79.899) High risk medication use (benzo for anxiety) - Plan: ALPRAZolam (Xanax) 0.25 MG tablet     -Anxiety: Unchanged, complicated by side effect of increased irritability from Klonopin, unclear etiology, agreed to restart Xanax, agreed to use a [place] pharmacy in the future due to changing COVID restrictions and BILL license of prescriber.  --Patient has been educated on the risk versus benefit of being prescribed a controlled substance, patient has been educated on additional monitoring that is required including Olga reports, random urine drug screens, and pill counts.      AFTER VISIT SUMMARY INSTRUCTIONS:    Medication changes:   Discontinue Klonopin, okay to start Xanax 0.25 mg up to twice a day as needed for acute anxiety, not for daily use.    Return in 3 months (on 12/22/2023) for Medication Check.    ADDITIONAL MONITORING FOR CONTROLLED SUBSTANCE:  -Narc Contract signed, renewed yearly: 8/23  -OLGA LAZARO 3M minimum scanned into EMR: 8/23  -PDMP via EMR interface reviewed  during f/u truong'ts and med refill requests  -UDS: random     Last Urine Toxicity           No data to display               Medications Discontinued During This Encounter   Medication Reason    clonazePAM (KlonoPIN) 0.5 MG tablet Side effects     New Medications Ordered This Visit   Medications    ALPRAZolam (Xanax) 0.25 MG tablet     Sig: Take 1 tablet by mouth 2 (Two) Times a Day As Needed for Anxiety.     Dispense:  60 tablet     Refill:  1      No orders of the defined types were placed in this encounter.    -Barriers: stress  -Strengths:  assertive and expressive of needs    -Progress toward goal: At goal  -Functional Status: Mild impairment   -Prognosis: Good with Ongoing Treatment      Part of this note may be an electronic transcription/translation of spoken language to printed text using the Dragon Dictation System. Some of the data in this electronic note has been brought forward from a previous encounter, any necessary changes have been made, it has been reviewed by this APRN, and it is accurate.    This document has been electronically signed by OLGA Arzola September 22, 2023 10:20 EDT

## 2023-11-17 ENCOUNTER — OFFICE VISIT (OUTPATIENT)
Dept: FAMILY MEDICINE CLINIC | Facility: CLINIC | Age: 43
End: 2023-11-17
Payer: COMMERCIAL

## 2023-11-17 VITALS
DIASTOLIC BLOOD PRESSURE: 70 MMHG | RESPIRATION RATE: 18 BRPM | HEIGHT: 67 IN | BODY MASS INDEX: 22.87 KG/M2 | SYSTOLIC BLOOD PRESSURE: 106 MMHG

## 2023-11-17 DIAGNOSIS — R59.1 LYMPHADENOPATHY: Primary | ICD-10-CM

## 2023-11-17 DIAGNOSIS — Z85.850 HISTORY OF THYROID CANCER: ICD-10-CM

## 2023-11-17 PROCEDURE — 99214 OFFICE O/P EST MOD 30 MIN: CPT | Performed by: NURSE PRACTITIONER

## 2023-11-17 NOTE — PROGRESS NOTES
Subjective   Sarahi Mooney is a 43 y.o. female.     History of Present Illness   Patient presents with c/o enlarged lymph node on the left side of her neck. She reports that she was at the dentist about 2 weeks ago.  She denies any pain or recent illness. She does reports thyroid issues in her family and is requesting testing.     The following portions of the patient's history were reviewed and updated as appropriate: allergies, current medications, past family history, past medical history, past social history, past surgical history and problem list.    Review of Systems   Constitutional:  Negative for chills, fatigue and fever.   HENT:  Positive for swollen glands and trouble swallowing. Negative for facial swelling, postnasal drip, sore throat and voice change.    Respiratory:  Negative for cough, chest tightness, shortness of breath and wheezing.    Cardiovascular:  Negative for chest pain, palpitations and leg swelling.   Neurological:  Negative for dizziness and headache.   Hematological: Negative.    Psychiatric/Behavioral: Negative.  Negative for sleep disturbance.        Objective   Physical Exam  Vitals and nursing note reviewed.   Constitutional:       Appearance: She is well-developed.   HENT:      Head: Normocephalic and atraumatic.   Eyes:      Conjunctiva/sclera: Conjunctivae normal.      Pupils: Pupils are equal, round, and reactive to light.   Cardiovascular:      Rate and Rhythm: Normal rate and regular rhythm.      Heart sounds: Normal heart sounds. No murmur heard.  Pulmonary:      Effort: Pulmonary effort is normal.      Breath sounds: Normal breath sounds.   Lymphadenopathy:      Head:      Right side of head: No submental, submandibular, tonsillar, preauricular, posterior auricular or occipital adenopathy.      Left side of head: Tonsillar adenopathy present. No submental, submandibular, preauricular, posterior auricular or occipital adenopathy.      Cervical:      Right cervical: No  superficial, deep or posterior cervical adenopathy.     Left cervical: Superficial cervical adenopathy present. No deep or posterior cervical adenopathy.      Upper Body:      Right upper body: No supraclavicular adenopathy.      Left upper body: No supraclavicular adenopathy.   Neurological:      Mental Status: She is alert and oriented to person, place, and time.   Psychiatric:         Behavior: Behavior normal.         Thought Content: Thought content normal.         Judgment: Judgment normal.         Vitals:    11/17/23 0928   BP: 106/70   Resp: 18     Body mass index is 22.87 kg/m².      Procedures    Assessment & Plan   Problems Addressed this Visit    None  Visit Diagnoses       Lymphadenopathy    -  Primary    Relevant Orders    CBC & Differential    Comprehensive Metabolic Panel    TSH    Thyroid Peroxidase Antibody    Thyroglobulin Antibody    T3, Free    T4, Free    US Head Neck Soft Tissue    History of thyroid cancer        Relevant Orders    CBC & Differential    Comprehensive Metabolic Panel    TSH    Thyroid Peroxidase Antibody    Thyroglobulin Antibody    T3, Free    T4, Free          Diagnoses         Codes Comments    Lymphadenopathy    -  Primary ICD-10-CM: R59.1  ICD-9-CM: 785.6     History of thyroid cancer     ICD-10-CM: Z85.850  ICD-9-CM: V10.87           CBC  CMP  Thyroid panel  Ultrasound of head and neck         Return if symptoms worsen or fail to improve.

## 2023-11-18 LAB
ALBUMIN SERPL-MCNC: 4.4 G/DL (ref 3.9–4.9)
ALBUMIN/GLOB SERPL: 2.1 {RATIO} (ref 1.2–2.2)
ALP SERPL-CCNC: 50 IU/L (ref 44–121)
ALT SERPL-CCNC: 16 IU/L (ref 0–32)
AST SERPL-CCNC: 22 IU/L (ref 0–40)
BASOPHILS # BLD AUTO: 0 X10E3/UL (ref 0–0.2)
BASOPHILS NFR BLD AUTO: 1 %
BILIRUB SERPL-MCNC: 0.3 MG/DL (ref 0–1.2)
BUN SERPL-MCNC: 11 MG/DL (ref 6–24)
BUN/CREAT SERPL: 11 (ref 9–23)
CALCIUM SERPL-MCNC: 9.2 MG/DL (ref 8.7–10.2)
CHLORIDE SERPL-SCNC: 103 MMOL/L (ref 96–106)
CO2 SERPL-SCNC: 25 MMOL/L (ref 20–29)
CREAT SERPL-MCNC: 1.03 MG/DL (ref 0.57–1)
EGFRCR SERPLBLD CKD-EPI 2021: 70 ML/MIN/1.73
EOSINOPHIL # BLD AUTO: 0.1 X10E3/UL (ref 0–0.4)
EOSINOPHIL NFR BLD AUTO: 1 %
ERYTHROCYTE [DISTWIDTH] IN BLOOD BY AUTOMATED COUNT: 11.9 % (ref 11.7–15.4)
GLOBULIN SER CALC-MCNC: 2.1 G/DL (ref 1.5–4.5)
GLUCOSE SERPL-MCNC: 92 MG/DL (ref 70–99)
HCT VFR BLD AUTO: 38.2 % (ref 34–46.6)
HGB BLD-MCNC: 12.8 G/DL (ref 11.1–15.9)
IMM GRANULOCYTES # BLD AUTO: 0 X10E3/UL (ref 0–0.1)
IMM GRANULOCYTES NFR BLD AUTO: 0 %
LYMPHOCYTES # BLD AUTO: 1.8 X10E3/UL (ref 0.7–3.1)
LYMPHOCYTES NFR BLD AUTO: 32 %
MCH RBC QN AUTO: 32.3 PG (ref 26.6–33)
MCHC RBC AUTO-ENTMCNC: 33.5 G/DL (ref 31.5–35.7)
MCV RBC AUTO: 97 FL (ref 79–97)
MONOCYTES # BLD AUTO: 0.4 X10E3/UL (ref 0.1–0.9)
MONOCYTES NFR BLD AUTO: 7 %
NEUTROPHILS # BLD AUTO: 3.4 X10E3/UL (ref 1.4–7)
NEUTROPHILS NFR BLD AUTO: 59 %
PLATELET # BLD AUTO: 292 X10E3/UL (ref 150–450)
POTASSIUM SERPL-SCNC: 4.3 MMOL/L (ref 3.5–5.2)
PROT SERPL-MCNC: 6.5 G/DL (ref 6–8.5)
RBC # BLD AUTO: 3.96 X10E6/UL (ref 3.77–5.28)
SODIUM SERPL-SCNC: 140 MMOL/L (ref 134–144)
T3FREE SERPL-MCNC: 2.9 PG/ML (ref 2–4.4)
T4 FREE SERPL-MCNC: 1.22 NG/DL (ref 0.82–1.77)
THYROPEROXIDASE AB SERPL-ACNC: 12 IU/ML (ref 0–34)
TSH SERPL DL<=0.005 MIU/L-ACNC: 1.36 UIU/ML (ref 0.45–4.5)
WBC # BLD AUTO: 5.7 X10E3/UL (ref 3.4–10.8)

## 2023-11-20 LAB — THYROGLOB AB SERPL-ACNC: <1 IU/ML (ref 0–0.9)

## 2023-11-27 ENCOUNTER — TELEPHONE (OUTPATIENT)
Dept: FAMILY MEDICINE CLINIC | Facility: CLINIC | Age: 43
End: 2023-11-27
Payer: COMMERCIAL

## 2023-11-27 ENCOUNTER — TRANSCRIBE ORDERS (OUTPATIENT)
Dept: ADMINISTRATIVE | Facility: HOSPITAL | Age: 43
End: 2023-11-27
Payer: COMMERCIAL

## 2023-11-27 DIAGNOSIS — Z12.31 BREAST CANCER SCREENING BY MAMMOGRAM: Primary | ICD-10-CM

## 2023-11-27 NOTE — TELEPHONE ENCOUNTER
Caller: Sarahi Mooney    Relationship: Self    Best call back number: [phone]     What orders are you requesting (i.e. lab or imaging): ULTRASOUND OF THE HEAD AND NECK    In what timeframe would the patient need to come in: ASAP    Where will you receive your lab/imaging services: [facility]    Additional notes: PATIENT DID NOT GET HER ULTRASOUND SCHEDULED IN TIME SO SHE WAS TOLD SHE HAS TO HAVE [facility] SEND NEW ORDERS.

## 2023-11-28 NOTE — TELEPHONE ENCOUNTER
Patient has been informed that referral does not need to be placed again, it is active and does not  until after 1 year.

## 2023-11-30 ENCOUNTER — HOSPITAL ENCOUNTER (OUTPATIENT)
Dept: MAMMOGRAPHY | Facility: HOSPITAL | Age: 43
Discharge: HOME | End: 2023-11-30
Payer: COMMERCIAL

## 2023-11-30 ENCOUNTER — HOSPITAL ENCOUNTER (OUTPATIENT)
Dept: ULTRASOUND IMAGING | Facility: HOSPITAL | Age: 43
Discharge: HOME | End: 2023-11-30
Payer: COMMERCIAL

## 2023-11-30 DIAGNOSIS — Z12.31 BREAST CANCER SCREENING BY MAMMOGRAM: ICD-10-CM

## 2023-11-30 DIAGNOSIS — R59.1 LYMPHADENOPATHY: ICD-10-CM

## 2023-11-30 PROCEDURE — 77067 SCR MAMMO BI INCL CAD: CPT

## 2023-11-30 PROCEDURE — 76536 US EXAM OF HEAD AND NECK: CPT

## 2023-11-30 PROCEDURE — 77063 BREAST TOMOSYNTHESIS BI: CPT

## 2023-12-01 DIAGNOSIS — R59.1 LYMPHADENOPATHY: Primary | ICD-10-CM

## 2024-04-08 ENCOUNTER — TELEMEDICINE (OUTPATIENT)
Dept: BEHAVIORAL HEALTH | Facility: CLINIC | Age: 44
End: 2024-04-08
Payer: COMMERCIAL

## 2024-04-08 DIAGNOSIS — Z79.899 HIGH RISK MEDICATION USE: ICD-10-CM

## 2024-04-08 DIAGNOSIS — F41.1 GAD (GENERALIZED ANXIETY DISORDER): Primary | ICD-10-CM

## 2024-04-08 RX ORDER — ALPRAZOLAM 0.25 MG/1
0.25 TABLET ORAL 2 TIMES DAILY PRN
Qty: 60 TABLET | Refills: 2 | Status: SHIPPED | OUTPATIENT
Start: 2024-04-08

## 2024-04-08 NOTE — PATIENT INSTRUCTIONS
-Controlled Substance Instructions:  Patient has been educated on additional monitoring that is required including regular follow up appointments, OLGA reports, random urine drug screens, and random pill counts.    Patient has read/reviewed controlled substance agreement and understands this must be renewed yearly.  Patient educated that continuation of a controlled substance is at the discretion of the provider.     Patient educated that prescribing will not continue unless follow-up appointments are maintained.  Prescriptions can only be sent to a [place] pharmacy, 30 day supply with limited number of refills. Refill requests will only be made/reviewed during normal business hours.   Patient has been educated on the risk versus benefit of being prescribed a controlled substance including dependence/tolerance/abuse/addiction/diversion.  Patient has been educated on non-controlled substance alternatives that carry with them much less risk and require less monitoring.  If the patient is unwilling or unable to comply with the above listed monitoring the controlled substance will no longer be prescribed.

## 2024-04-08 NOTE — PROGRESS NOTES
Patient assessed today via MyChart through EPIC pt is at home in a secure environment and verbalizes privacy during interview. EMILY Jose is at home in a secure environment using a secure laptop.The patient's condition being diagnosed/treated is appropriate for telemedicine.The provider identified himself as well as his credentials.The patient, and/or patient's guardian, consent to be seen remotely, and when consent is given they understand that the consent allows for patient identifiable information to be sent to a third party as needed. They may refuse to be seen remotely at any time. The electronic data is encrypted and password protected, and the patient and/or guardian has been advised of the potential risks to privacy not withstanding such measures.    [facility] PROGRESS NOTE  [facility]  [address] [place] [address]    NAME: Sarahi Mooney     : 1980   MRN: [id number]   PCP: Jailene Coon APRN     DATE: 2024    ALLERGY:Patient has no known allergies.     MEDICATIONS:  Current Outpatient Medications   Medication Instructions    albuterol sulfate  (90 Base) MCG/ACT inhaler 2 puffs, Inhalation, Every 4 Hours PRN    ALPRAZolam (XANAX) 0.25 mg, Oral, 2 Times Daily PRN    cetirizine (ZYRTEC) 10 mg, Oral, Daily    Levonorgestrel (MIRENA) 20 MCG/DAY intrauterine device IUD 1 each, Intrauterine    Magnesium Gluconate (MAGNESIUM 27 PO) Oral     VITALS: There were no vitals taken for this visit. No LMP recorded. Patient has had an implant.     Subjective     Chief Complaint   Patient presents with    Anxiety    Follow-up      HPI:  43 y.o. female patient seen for follow up.  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  No S/S of panic reported or in evidence.    S/E to medications: Denies    Sleep Problems: Yes, tied to stress/situational factors including high stress job    PHQ: (P) 4    JOCY: (P) 5    New Medications: Denies    New Medical Problems: Denies    Major changes since last time being seen: Denies    Social Status:  Ethnic Group: Not  Or   Race: White Or   Marital Status:    Employment status: Full Time [employer]       SUBSTANCE/SEXUAL HISTORY:   reports that she has never smoked. She has never used smokeless tobacco. She reports that she does not currently use alcohol. She reports that she does not use drugs.   reports being sexually active and has had partner(s) who are male. She reports using the following method of birth control/protection: I.U.D..      FAMILY HISTORY:  family history includes Alcohol abuse in her father; Coronary artery disease (age of onset: 55) in her paternal grandfather; Hypertension in her father; Other in her paternal aunt; Stroke (age of onset: 75) in an other family member.     PAST MEDICAL HISTORY:   has a past medical history of Anxiety and depression, Asthma, JOCY (generalized anxiety disorder) (08/17/2023), HSV-1 infection, and Latent tuberculosis.    She has no past medical history of ADHD (attention deficit hyperactivity disorder), Alcohol abuse, Alcoholism, Anorexia nervosa, Autism spectrum disorder, Bipolar disorder, Borderline personality disorder, Bulimia nervosa, Cancer, Chronic pain disorder, Disease of thyroid gland, Head injury, HIV disease, Liver disease, Obsessive-compulsive disorder, Oppositional defiant disorder, Panic disorder,  Peripheral neuropathy, Psychosis, PTSD (post-traumatic stress disorder), Schizoaffective disorder, Seizures, Self-injurious behavior, Substance abuse, Suicide attempt, Violence, history of, Withdrawal symptoms, alcohol, or Withdrawal symptoms, drug or narcotic.     Review of Systems   Constitutional: Negative.  Negative for chills and fever.   Respiratory:  Negative for chest tightness and shortness of breath.    Cardiovascular:  Negative for chest pain.   Gastrointestinal:  Negative for nausea and vomiting.   Neurological:  Negative for dizziness and light-headedness.   Psychiatric/Behavioral:  Positive for sleep disturbance and stress. Negative for suicidal ideas. The patient is nervous/anxious.      Objective   PHYSICAL EXAM:  Constitutional:       Appearance: Normal appearance.   HENT:      Head: Normocephalic.   Pulmonary:      Effort: Pulmonary effort is normal.   Skin:     General: Skin is dry.   Neurological:      Mental Status: He/She/They are alert and oriented to person, place, and time.     PHQ-9 Depression Screening  Little interest or pleasure in doing things? (P) 1-->several days   Feeling down, depressed, or hopeless? (P) 1-->several days   Trouble falling or staying asleep, or sleeping too much? (P) 1-->several days   Feeling tired or having little energy?     Poor appetite or overeating? (P) 0-->not at all   Feeling bad about yourself/you are a failure/have let yourself or your family down? (P) 0-->not at all   Trouble concentrating on things? (P) 0-->not at all   Psychomotor agitation/retardation (P) 0-->not at all   Thoughts about death/dying/suicide (P) 0-->not at all   PHQ-9 Total Score (P) 4   How difficult have these problems for you? (P) somewhat difficult     GAD7 Documentation:  Feeling nervous, anxious or on edge (P) 1   Not being able to stop or control worrying (P) 1   Worrying too much about different things (P) 1   Trouble relaxing (P) 1   Being so restless that it is hard to sit  still (P) 0   Becoming easily annoyed or irritable (P) 1   Feeling Afraid as if something awful might happen (P) 0   JOCY Total Score (P) 5   How difficult have these problems made it for you? (P) Somewhat difficult     MENTAL STATUS EXAM   General Appearance:  Cleanly groomed and dressed  Eye Contact:  Good eye contact  Attitude:  Cooperative  Motor Activity:  Normal gait, posture  Speech:  Normal rate, tone, volume  Mood and affect:  Irritable  Thought Process:  Goal-directed  Associations/ Thought Content:  No delusions  Hallucinations:  None  Suicidal Ideations:  Not present  Homicidal Ideation:  Not present  Sensorium:  Alert  Orientation:  Person, place, time and situation  Fund of Knowledge:  Appropriate for age and educational level  Intellectual Functioning:  Average range  Insight:  Fair  Judgement:  Fair  Reliability:  Fair  Impulse Control:  Fair     LABS:  CBC          6/30/2023    12:04 11/17/2023    09:47   CBC   WBC 7.7  5.7    RBC 4.08  3.96    Hemoglobin 13.6  12.8    Hematocrit 39.4  38.2    MCV 97  97    MCH 33.3  32.3    MCHC 34.5  33.5    RDW 11.7  11.9    Platelets 315  292       CMP          6/30/2023    12:04 11/17/2023    09:47   CMP   Glucose 88  92    BUN 14  11    Creatinine 0.91  1.03    Sodium 140  140    Potassium 4.2  4.3    Chloride 103  103    Calcium 9.2  9.2    Total Protein 6.8  6.5    Albumin 4.7  4.4    Globulin 2.1  2.1    Total Bilirubin 0.4  0.3    Alkaline Phosphatase 47  50    AST (SGOT) 20  22    ALT (SGPT) 17  16    BUN/Creatinine Ratio 15  11       Assessment    ASSESSMENT/TREATMENT PLAN/INSTRUCTIONS/EDUCATION    (F41.1) JOCY (generalized anxiety disorder) - Plan: ALPRAZolam (Xanax) 0.25 MG tablet    (Z79.899) High risk medication use - Plan: ALPRAZolam (Xanax) 0.25 MG tablet     Anxiety, stable, continue xanax bid prn (uses sparingly)  High Risk Medication: OLGA reviewed, continue close monitoring  F/U 6M    -Controlled Substance Instructions:  Patient has been  educated on additional monitoring that is required including regular follow up appointments, OLGA reports, random urine drug screens, and random pill counts.    Patient has read/reviewed controlled substance agreement and understands this must be renewed yearly.  Patient educated that continuation of a controlled substance is at the discretion of the provider.     Patient educated that prescribing will not continue unless follow-up appointments are maintained.  Prescriptions can only be sent to a [place] pharmacy, 30 day supply with limited number of refills. Refill requests will only be made/reviewed during normal business hours.   Patient has been educated on the risk versus benefit of being prescribed a controlled substance including dependence/tolerance/abuse/addiction/diversion.  Patient has been educated on non-controlled substance alternatives that carry with them much less risk and require less monitoring.  If the patient is unwilling or unable to comply with the above listed monitoring the controlled substance will no longer be prescribed.     Return in about 6 months (around 10/8/2024) for Recheck.    ADDITIONAL MONITORING FOR CONTROLLED SUBSTANCE:  -Narc Contract signed, renewed yearly: 8/23  -PDMP via EMR interface reviewed during f/u truong'ts and med refill requests  -UDS: random     Last Urine Toxicity           No data to display               Medications Discontinued During This Encounter   Medication Reason    ALPRAZolam (Xanax) 0.25 MG tablet Reorder       New Medications Ordered This Visit   Medications    ALPRAZolam (Xanax) 0.25 MG tablet     Sig: Take 1 tablet by mouth 2 (Two) Times a Day As Needed for Anxiety.     Dispense:  60 tablet     Refill:  2        No orders of the defined types were placed in this encounter.    -Barriers: stress  -Strengths:  assertive and expressive of needs    -Short-Term Goals: Pt will be compliant with medication management and note improvement in S/S over the next 4 to  6 weeks or at next scheduled visit.  -Long-Term Goals: Pt will be compliant with the agreed treatment plan including medication regimen & F/U appt's and deny impairment in daily functioning over the next 6 months.      -Progress toward goal: At goal  -Functional Status: Mild impairment   -Prognosis: Good with Ongoing Treatment      SUMMARY/DISCUSSION:  Pt past social/medical/family history reviewed/updated. ROS conducted, medications/allergies, reviewed.  Most recent vitals/labs reviewed. Pt was given appropriate time to ask questions and concerns were addressed. A thorough discussion was had that included review of disease process, need for continued monitoring and additional treatment options including use of pharmacological and non-pharmacological approaches to care, decisions were made and agreed upon by patient and provider. Discussed the risks, benefits, and potential side effects of the medications; patient ackowledged and verbally consented.     Part of this note may be an electronic transcription/translation of spoken language to printed text using the Dragon Dictation System. Some of the data in this electronic note has been brought forward from a previous encounter, any necessary changes have been made, it has been reviewed by this APRN, and it is accurate.    This document has been electronically signed by OLGA Arzola April 8, 2024 08:55 EDT

## 2024-05-03 NOTE — PATIENT INSTRUCTIONS
NEUROLOGY NEW CONSULTATION NOTE:                             Juliana Singh (: 1985) is a 38 year old, {RIGHT:029657} female seen in neurologic consultation on 5/3/2024  at the request of  *** for New Patient (Diziness, headaches and ringing In both ears since 2024)      Patient is accompanied by: {ACCOMPANIED BY (3):[id number]}.    HISTORY OF PRESENT ILLNESS:     On Wednesday in mid-April, she started using Invisalign.  The third night, she started clenching her jaw really bad.  Sh eassumed it was from the Invisalign. 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 She stopped her lesson.  Dizziness is vague but with sensaiton of motion and nausea. She went to the ED.  She was dry heaving. She was given Reglan and Benadryl.  She was told she might have     The following day, she went to her PCP and was given amoxicillin for a sinus infecton.  She wsa also referred to an ENT.  ENT did not think she was     Bifrontal, similar to sinus headache, without throbbing/pulsating.  No vison changes. + nausea, vomiting- was constantly happening for 7 days.  No photophobia, + phonophobia.  She also had constant whooshing in her ears, affecting both ears. Headaches were better when laying down.  She was constantly sleeping.  Was also on her period.  Other than severity, the headaches were not much different than headaches she experiencies monthly before her periods.  Had some imbalance but only when dizzy.          Headaches were not different than     Has neck tension that travels down her spine.      Still feeling heaviness in the head.      PAST MEDICAL, SURGICAL, FAMILY AND SOCIAL HISTORY:  Patient Active Problem List   Diagnosis    Anxiety with depression    Hypothyroidism    Encounter for general adult medical examination w/o abnormal findings    Concern about STD in female without diagnosis    Vaccination not carried out because of patient refusal    Dry lips    Elevated d-dimer     No  Preventive Care 21-39 Years Old, Female  Preventive care refers to lifestyle choices and visits with your health care provider that can promote health and wellness. This includes:  · A yearly physical exam. This is also called an annual wellness visit.  · Regular dental and eye exams.  · Immunizations.  · Screening for certain conditions.  · Healthy lifestyle choices, such as:  ? Eating a healthy diet.  ? Getting regular exercise.  ? Not using drugs or products that contain nicotine and tobacco.  ? Limiting alcohol use.  What can I expect for my preventive care visit?  Physical exam  Your health care provider may check your:  · Height and weight. These may be used to calculate your BMI (body mass index). BMI is a measurement that tells if you are at a healthy weight.  · Heart rate and blood pressure.  · Body temperature.  · Skin for abnormal spots.  Counseling  Your health care provider may ask you questions about your:  · Past medical problems.  · Family's medical history.  · Alcohol, tobacco, and drug use.  · Emotional well-being.  · Home life and relationship well-being.  · Sexual activity.  · Diet, exercise, and sleep habits.  · Work and work environment.  · Access to firearms.  · Method of birth control.  · Menstrual cycle.  · Pregnancy history.  What immunizations do I need?    Vaccines are usually given at various ages, according to a schedule. Your health care provider will recommend vaccines for you based on your age, medical history, and lifestyle or other factors, such as travel or where you work.  What tests do I need?    Blood tests  · Lipid and cholesterol levels. These may be checked every 5 years starting at age 20.  · Hepatitis C test.  · Hepatitis B test.  Screening  · Diabetes screening. This is done by checking your blood sugar (glucose) after you have not eaten for a while (fasting).  · STD (sexually transmitted disease) testing, if you are at risk.  · BRCA-related cancer screening. This may be  done if you have a family history of breast, ovarian, tubal, or peritoneal cancers.  · Pelvic exam and Pap test. This may be done every 3 years starting at age 21. Starting at age 30, this may be done every 5 years if you have a Pap test in combination with an HPV test.  Talk with your health care provider about your test results, treatment options, and if necessary, the need for more tests.  Follow these instructions at home:  Eating and drinking    · Eat a healthy diet that includes fresh fruits and vegetables, whole grains, lean protein, and low-fat dairy products.  · Take vitamin and mineral supplements as recommended by your health care provider.  · Do not drink alcohol if:  ? Your health care provider tells you not to drink.  ? You are pregnant, may be pregnant, or are planning to become pregnant.  · If you drink alcohol:  ? Limit how much you have to 0-1 drink a day.  ? Be aware of how much alcohol is in your drink. In the [place] one drink equals one 12 oz bottle of beer (355 mL), one 5 oz glass of wine (148 mL), or one 1½ oz glass of hard liquor (44 mL).  Lifestyle  · Take daily care of your teeth and gums. Brush your teeth every morning and night with fluoride toothpaste. Floss one time each day.  · Stay active. Exercise for at least 30 minutes 5 or more days each week.  · Do not use any products that contain nicotine or tobacco, such as cigarettes, e-cigarettes, and chewing tobacco. If you need help quitting, ask your health care provider.  · Do not use drugs.  · If you are sexually active, practice safe sex. Use a condom or other form of birth control (contraception) in order to prevent pregnancy and STIs (sexually transmitted infections). If you plan to become pregnant, see your health care provider for a pre-conception visit.  · Find healthy ways to cope with stress, such as:  ? Meditation, yoga, or listening to music.  ? Journaling.  ? Talking to a trusted person.  ? Spending time with friends and  past surgical history on file.  Family History   Problem Relation Age of Onset    Diabetes Mother     Hypertension Mother     Diabetes Father     Migraine Father      Social History     Tobacco Use    Smoking status: Former     Types: Cigarettes    Smokeless tobacco: Never   Vaping Use    Vaping status: never used   Substance Use Topics    Alcohol use: Not Currently    Drug use: Never       Patient is allergic to no known allergies..    MEDICATIONS:    Current Outpatient Medications   Medication Sig    sertraline (ZOLOFT) 25 MG tablet TAKE 1 TABLET BY MOUTH DAILY    levothyroxine 50 MCG tablet      No current facility-administered medications for this visit.       REVIEW OF SYSTEMS:  {SWROS:[id number]::\"12 point ROS performed and was otherwise unremarkable (with pertinent positives, if present, listed below):\"}      PHYSICAL EXAM:  Vitals: /68 (BP Location: LUE - Left upper extremity, Patient Position: Sitting, Cuff Size: Regular)   Pulse 72   Resp 17   Ht 5' 6\" (1.676 m)   Wt 92.7 kg (204 lb 5.9 oz)   LMP 04/19/2024 (Exact Date)   BMI 32.99 kg/m²   BSA 2.02 m²    Gen: No acute distress. Is pleasant and interactive.  Head:  Normocephalic and atraumatic.    ENT:  Normal appearing nose and ears, no exudates  Heart:  Normal rate and regular rhythm.  Lungs:  Normal respiratory rate and effort.  No wheeze.  Extremities:  No edema in lower extremities. No discoloration or deformities.  Skin: No obvious rashes noted on body. Skin warm and dry.  Musculoskeletal:  Good ROM in neck and back without any tenderness or spasm.  Psych:  Affect was appropriate to situation and mood was normal.  Neurologic:  Neurologic:  Mental status:  Alert and appropriately oriented, fluent speech, intact naming, repetition and comprehension.  Attention intact  Cranial nerves:   Visual fields full.  Fundoscopic exam: Discs flat with sharp margins. Pupils equal and briskly reactive to light.  Extraocular muscles intact.  Facial sensation  family.  Safety  · Always wear your seat belt while driving or riding in a vehicle.  · Do not drive:  ? If you have been drinking alcohol. Do not ride with someone who has been drinking.  ? When you are tired or distracted.  ? While texting.  · Wear a helmet and other protective equipment during sports activities.  · If you have firearms in your house, make sure you follow all gun safety procedures.  · Seek help if you have been physically or sexually abused.  What's next?  · Go to your health care provider once a year for an annual wellness visit.  · Ask your health care provider how often you should have your eyes and teeth checked.  · Stay up to date on all vaccines.  This information is not intended to replace advice given to you by your health care provider. Make sure you discuss any questions you have with your health care provider.  Document Revised: 09/02/2020 Document Reviewed: 08/29/2019  [employer] Patient Education © 2021 [employer]       intact.  No facial asymmetry.  Intact eye closure and no nasolabial fold flattening.  Hearing grossly intact.  Soft palate elevates equally, uvula is midline. Tongue midline.  Shoulder shrug symmetric.  Motor:  Tone normal. Bulk full. Strength 5/5 in UE and LE. Intact dexterity.  Sensory:  intact to lt touch, pinprick, vibration in UE and LE  Cerebellar:  FTN/HTS intact bilaterally, RAYSHAWN intact. No abnormal movements.   Reflexes: 2+ symmetric.  Downgoing toes bilaterally.  Negative Hardy sign.   Gait : Narrow based stance.  Intact casual gait, able to tandem, walk on heels and toes.  Romberg negative.      TESTING/RESULTS/RECORDS REVIEWED:    ***    IMPRESSION/PLAN:    Problem List Items Addressed This Visit    None      There are no Patient Instructions on file for this visit.    We discussed {Discussed with patient:[id number]} along with when to schedule next appointment.    Malu Gutierres DO   [facility]  [address] [place] [address]  [id number]    Time spent during the encounter:   Previsit- Reviewed prior clinic notes: *** min  Visit- Performed medically appropriate history& physical examination, surveillance labs & diagnostic studies were requested or reviewed, discussed diagnosis & prognosis, risks and benefits of treatment options, instructions for management, treatment, compliance, and follow up care along with patient/family education provided.***   Post visit- Documenting the encounter *** min   improving.  She had a normal CTH and has a non-focal neurological exam- both are reassuring.  Recommend increasing water intake, regular exercise regimen and stress management for headache prevention.  Will not pursue additional imaging now but will consider MRI brain is headaches continue in few more weeks (advised patient to call the clinic with updates).      Follow up in 2 months    Problem List Items Addressed This Visit    None  Visit Diagnoses       New onset headache    -  Primary    Tinnitus of both ears              We discussed plan of care from here along with when to schedule next appointment.    Malu Gutierres DO   [facility]  [address]  [place]  [address]    I attest that I spent 41 total minutes for this patient's encounter.  This total time included the following components:   Reviewing patient's chart, Reviewing results, Obtaining a medical history, Performing a physical examination, Counseling patient, family member or caregiver, Ordering medications, tests, or procedures, Documenting clinical information in the EHR and Independently interpreting results.

## 2024-10-09 ENCOUNTER — TELEMEDICINE (OUTPATIENT)
Dept: BEHAVIORAL HEALTH | Facility: CLINIC | Age: 44
End: 2024-10-09
Payer: COMMERCIAL

## 2024-10-09 DIAGNOSIS — Z79.899 HIGH RISK MEDICATION USE: ICD-10-CM

## 2024-10-09 DIAGNOSIS — F41.1 GAD (GENERALIZED ANXIETY DISORDER): Primary | ICD-10-CM

## 2024-10-09 PROCEDURE — 99213 OFFICE O/P EST LOW 20 MIN: CPT

## 2024-10-09 NOTE — PATIENT INSTRUCTIONS
-Controlled Substance Instructions:  Patient has been educated on additional monitoring that is required including regular follow up appointments, OLGA reports, random urine drug screens, and random pill counts.    Patient has read/reviewed controlled substance agreement and understands this must be renewed yearly.  Patient educated that continuation of a controlled substance is at the discretion of the provider.     Patient educated that prescribing will not continue unless follow-up appointments are maintained.  Prescriptions can only be sent to a [place] pharmacy, 30 day supply with limited number of refills. Refill requests will only be made/reviewed during normal business hours.   Patient has been educated on the risk versus benefit of being prescribed a controlled substance including dependence/tolerance/abuse/addiction/diversion.  Patient has been educated on non-controlled substance alternatives that carry with them much less risk and require less monitoring.  If the patient is unwilling or unable to comply with the above listed monitoring the controlled substance will no longer be prescribed.      GENERAL NEW PATIENT INSTRUCTIONS:    -Please arrive in person or virtually 10-15 minutes prior to appointment to allow for registration/sign in/questionnaires. If you are seen virtually please review after visit summary (AVS)/patient instructions via [facility] for a summary of plan of care/changes in treatment plan. If you are having difficulties logging on or accessing Parental [facility] please contact my office for assistance.    -The best way to get a hold of Damon is to call the office at [phone] and ask to leave a message with his medical assistant. Damon Adame is a Psychiatric Mental Health Nurse Practitioner, due to his specialty patient's are not able to message him directly via Parental [facility]. Please give my office up to 48 hours to respond to a patient call/question/refill request. Refill requests will be made during  normal office hours only, Monday-Friday 8:00-5:30.      -Damon is out of the office on Fridays and weekends. Tuesdays and Thursdays are Damon's in-office days, Mondays and Wednesdays are his telehealth days.  The decision to be seen virtually or in person is up to the discretion of the provider, not all behavioral health problems are appropriate for telehealth.    -Please call the office at [phone] with any worsening of symptoms or onset of intolerable side effects.  -Follow-up appointments must be maintained in order for prescribing to continue.  -Patient has been educated regarding multimodal approach with healthy nutrition, healthy sleep, regular physical activity, social activities, counseling, and medications.   -Please call 911 or go to the nearest ER if you begin to have thoughts of harming yourself or other people.    No show policy:  We understand unexpected circumstances arise; however, anytime you miss your appointment we are unable to provide you appropriate care.  In addition, each appointment missed could have been used to provide care for others.  We ask that you call at least 24 hours in advance to cancel or reschedule an appointment. We would like to take this opportunity to remind you of our policy stating patients who miss THREE or more appointments without cancelling or rescheduling 24 hours in advance of the appointment may be subject to cancellation of any further visits with our clinic and recommendation to seek in-person services/visits. Please call [phone] to reschedule your appointment. If there are reasons that make it difficult for you to keep the appointments, please call and let us know how we can help. Please understand that medication prescribing will not continue without seeing your provider.
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
## 2024-10-22 ENCOUNTER — OFFICE VISIT (OUTPATIENT)
Dept: FAMILY MEDICINE CLINIC | Facility: CLINIC | Age: 44
End: 2024-10-22
Payer: COMMERCIAL

## 2024-10-22 VITALS
SYSTOLIC BLOOD PRESSURE: 102 MMHG | DIASTOLIC BLOOD PRESSURE: 80 MMHG | BODY MASS INDEX: 22.91 KG/M2 | RESPIRATION RATE: 18 BRPM | WEIGHT: 146 LBS | HEIGHT: 67 IN

## 2024-10-22 DIAGNOSIS — Z13.228 ENCOUNTER FOR SCREENING FOR OTHER METABOLIC DISORDERS: ICD-10-CM

## 2024-10-22 DIAGNOSIS — Z13.220 SCREENING FOR HYPERLIPIDEMIA: ICD-10-CM

## 2024-10-22 DIAGNOSIS — Z00.00 ANNUAL PHYSICAL EXAM: Primary | ICD-10-CM

## 2024-10-22 DIAGNOSIS — Z23 NEED FOR VACCINATION: ICD-10-CM

## 2024-10-22 LAB
ALBUMIN SERPL-MCNC: 4.2 G/DL (ref 3.5–5.2)
ALBUMIN/GLOB SERPL: 1.8 G/DL
ALP SERPL-CCNC: 51 U/L (ref 39–117)
ALT SERPL-CCNC: 14 U/L (ref 1–33)
AST SERPL-CCNC: 17 U/L (ref 1–32)
BASOPHILS # BLD AUTO: 0.03 10*3/MM3 (ref 0–0.2)
BASOPHILS NFR BLD AUTO: 0.3 % (ref 0–1.5)
BILIRUB SERPL-MCNC: <0.2 MG/DL (ref 0–1.2)
BUN SERPL-MCNC: 22 MG/DL (ref 6–20)
BUN/CREAT SERPL: 22.7 (ref 7–25)
CALCIUM SERPL-MCNC: 9.3 MG/DL (ref 8.6–10.5)
CHLORIDE SERPL-SCNC: 103 MMOL/L (ref 98–107)
CHOLEST SERPL-MCNC: 233 MG/DL (ref 0–200)
CO2 SERPL-SCNC: 25.4 MMOL/L (ref 22–29)
CREAT SERPL-MCNC: 0.97 MG/DL (ref 0.57–1)
EGFRCR SERPLBLD CKD-EPI 2021: 75 ML/MIN/1.73
EOSINOPHIL # BLD AUTO: 0.01 10*3/MM3 (ref 0–0.4)
EOSINOPHIL NFR BLD AUTO: 0.1 % (ref 0.3–6.2)
ERYTHROCYTE [DISTWIDTH] IN BLOOD BY AUTOMATED COUNT: 11.3 % (ref 12.3–15.4)
GLOBULIN SER CALC-MCNC: 2.3 GM/DL
GLUCOSE SERPL-MCNC: 92 MG/DL (ref 65–99)
HCT VFR BLD AUTO: 38.7 % (ref 34–46.6)
HDLC SERPL-MCNC: 64 MG/DL (ref 40–60)
HGB BLD-MCNC: 12.9 G/DL (ref 12–15.9)
IMM GRANULOCYTES # BLD AUTO: 0.03 10*3/MM3 (ref 0–0.05)
IMM GRANULOCYTES NFR BLD AUTO: 0.3 % (ref 0–0.5)
LDLC SERPL CALC-MCNC: 155 MG/DL (ref 0–100)
LDLC/HDLC SERPL: 2.38 {RATIO}
LYMPHOCYTES # BLD AUTO: 1.75 10*3/MM3 (ref 0.7–3.1)
LYMPHOCYTES NFR BLD AUTO: 18 % (ref 19.6–45.3)
MCH RBC QN AUTO: 32.7 PG (ref 26.6–33)
MCHC RBC AUTO-ENTMCNC: 33.3 G/DL (ref 31.5–35.7)
MCV RBC AUTO: 98 FL (ref 79–97)
MONOCYTES # BLD AUTO: 0.48 10*3/MM3 (ref 0.1–0.9)
MONOCYTES NFR BLD AUTO: 4.9 % (ref 5–12)
NEUTROPHILS # BLD AUTO: 7.4 10*3/MM3 (ref 1.7–7)
NEUTROPHILS NFR BLD AUTO: 76.4 % (ref 42.7–76)
NRBC BLD AUTO-RTO: 0 /100 WBC (ref 0–0.2)
PLATELET # BLD AUTO: 275 10*3/MM3 (ref 140–450)
POTASSIUM SERPL-SCNC: 4.4 MMOL/L (ref 3.5–5.2)
PROT SERPL-MCNC: 6.5 G/DL (ref 6–8.5)
RBC # BLD AUTO: 3.95 10*6/MM3 (ref 3.77–5.28)
SODIUM SERPL-SCNC: 139 MMOL/L (ref 136–145)
TRIGL SERPL-MCNC: 83 MG/DL (ref 0–150)
VLDLC SERPL CALC-MCNC: 14 MG/DL (ref 5–40)
WBC # BLD AUTO: 9.7 10*3/MM3 (ref 3.4–10.8)

## 2024-10-22 PROCEDURE — 99396 PREV VISIT EST AGE 40-64: CPT | Performed by: NURSE PRACTITIONER

## 2024-10-22 PROCEDURE — 90471 IMMUNIZATION ADMIN: CPT | Performed by: NURSE PRACTITIONER

## 2024-10-22 PROCEDURE — 90656 IIV3 VACC NO PRSV 0.5 ML IM: CPT | Performed by: NURSE PRACTITIONER

## 2024-10-22 NOTE — PATIENT INSTRUCTIONS
Addended by: CARA DUNN on: 10/27/2022 03:05 PM     Modules accepted: Orders     I will call call or send [employer] message with your lab results.   Please call with any questions or concerns.    Return in about 1 year (around 10/22/2025) for Annual, Labs.

## 2024-10-22 NOTE — PROGRESS NOTES
Preventive Exam    History of Present Illness: Sarahi Mooney is a 44 y.o. here for check up and review of routine health maintenance. she states she is doing well and has no concerns.    Past medical history, surgical history and family history have been reviewed.     Review of Systems   Constitutional:  Negative for appetite change, chills, fatigue and fever.   HENT:  Negative for congestion, dental problem, hearing loss, nosebleeds, sinus pressure and sore throat.         Dental exam is up to date.    Eyes: Negative.  Negative for blurred vision, double vision, photophobia and visual disturbance.        Wears glasses. Eye exam is up to date.    Respiratory:  Negative for cough, chest tightness, shortness of breath and wheezing.    Cardiovascular:  Negative for chest pain, palpitations and leg swelling.   Gastrointestinal:  Negative for abdominal pain, constipation, diarrhea, nausea, vomiting and GERD.   Endocrine: Negative.  Negative for cold intolerance and heat intolerance.   Genitourinary: Negative.  Negative for breast discharge, breast lump, breast pain, dyspareunia, flank pain, menstrual problem, pelvic pain, pelvic pressure, vaginal bleeding and vaginal discharge.   Musculoskeletal:  Negative for arthralgias, back pain, joint swelling and myalgias.   Skin: Negative.  Negative for color change, rash and skin lesions.   Allergic/Immunologic: Negative.  Negative for environmental allergies and food allergies.   Neurological:  Negative for dizziness, weakness, numbness and headache.   Hematological: Negative.  Does not bruise/bleed easily.   Psychiatric/Behavioral: Negative.  Negative for hallucinations, sleep disturbance, suicidal ideas and depressed mood. The patient is not nervous/anxious.        PHYSICAL EXAM    Vitals:    10/22/24 0948   BP: 102/80   Resp: 18       Body mass index is 22.87 kg/m².   BMI is within normal parameters. No other follow-up for BMI required.       Physical Exam  Vitals and  nursing note reviewed.   Constitutional:       Appearance: Normal appearance. She is well-developed and normal weight.   HENT:      Head: Normocephalic and atraumatic.      Right Ear: Tympanic membrane, ear canal and external ear normal.      Left Ear: Tympanic membrane, ear canal and external ear normal.      Nose: Nose normal.      Mouth/Throat:      Lips: Pink.      Mouth: Mucous membranes are moist.      Tongue: No lesions.      Palate: No mass and lesions.      Pharynx: Oropharynx is clear. Uvula midline.      Tonsils: No tonsillar exudate.   Eyes:      Conjunctiva/sclera: Conjunctivae normal.      Pupils: Pupils are equal, round, and reactive to light.   Neck:      Thyroid: No thyromegaly.   Cardiovascular:      Rate and Rhythm: Normal rate and regular rhythm.      Pulses: Normal pulses.           Dorsalis pedis pulses are 2+ on the right side and 2+ on the left side.        Posterior tibial pulses are 2+ on the right side and 2+ on the left side.      Heart sounds: Normal heart sounds. No murmur heard.  Pulmonary:      Effort: Pulmonary effort is normal.      Breath sounds: Normal breath sounds.   Abdominal:      General: Bowel sounds are normal. There is no distension.      Palpations: Abdomen is soft.      Tenderness: There is no abdominal tenderness.   Musculoskeletal:         General: No deformity. Normal range of motion.      Cervical back: Normal range of motion and neck supple.      Right lower leg: No edema.      Left lower leg: No edema.   Lymphadenopathy:      Head:      Right side of head: No submental, submandibular, tonsillar, preauricular, posterior auricular or occipital adenopathy.      Left side of head: No submental, submandibular, tonsillar, preauricular, posterior auricular or occipital adenopathy.      Cervical: No cervical adenopathy.      Right cervical: No superficial, deep or posterior cervical adenopathy.     Left cervical: No superficial, deep or posterior cervical adenopathy.       Upper Body:      Right upper body: No supraclavicular adenopathy.      Left upper body: No supraclavicular adenopathy.   Skin:     General: Skin is warm and dry.      Capillary Refill: Capillary refill takes 2 to 3 seconds.   Neurological:      General: No focal deficit present.      Mental Status: She is alert and oriented to person, place, and time.      Cranial Nerves: No cranial nerve deficit.      Sensory: Sensation is intact.      Motor: Motor function is intact.      Coordination: Coordination is intact.      Gait: Gait is intact.   Psychiatric:         Attention and Perception: Attention and perception normal.         Mood and Affect: Mood and affect normal.         Speech: Speech normal.         Behavior: Behavior normal. Behavior is cooperative.         Thought Content: Thought content normal.         Cognition and Memory: Cognition and memory normal.         Judgment: Judgment normal.         Procedures    Diagnoses and all orders for this visit:    1. Annual physical exam (Primary)    2. Screening for hyperlipidemia  -     Lipid Panel With LDL/HDL Ratio    3. Need for vaccination  -     Fluzone >6mos (8844-0112)    4. Encounter for screening for other metabolic disorders  -     CBC & Differential  -     Comprehensive Metabolic Panel        Problems Addressed this Visit    None  Visit Diagnoses       Annual physical exam    -  Primary    Screening for hyperlipidemia        Relevant Orders    Lipid Panel With LDL/HDL Ratio    Need for vaccination        Relevant Orders    Fluzone >6mos (3500-9150)    Encounter for screening for other metabolic disorders        Relevant Orders    CBC & Differential    Comprehensive Metabolic Panel          Diagnoses         Codes Comments    Annual physical exam    -  Primary ICD-10-CM: Z00.00  ICD-9-CM: V70.0     Screening for hyperlipidemia     ICD-10-CM: Z13.220  ICD-9-CM: V77.91     Need for vaccination     ICD-10-CM: Z23  ICD-9-CM: V05.9     Encounter for screening for  other metabolic disorders     ICD-10-CM: Z13.228  ICD-9-CM: V77.99           Lipid panel  CBC  CMP  Flu vaccine  Routine health maintenance reviewed and discussed with Sarahi Mooney.    Preventative counseling regarding healthy diet and exercise.   Pt reports that he wears a seatbelt regularly.     Return in about 1 year (around 10/22/2025) for Annual, Labs.

## 2025-01-31 DIAGNOSIS — Z79.899 HIGH RISK MEDICATION USE: ICD-10-CM

## 2025-01-31 DIAGNOSIS — F41.1 GAD (GENERALIZED ANXIETY DISORDER): ICD-10-CM

## 2025-01-31 RX ORDER — ALPRAZOLAM 0.25 MG/1
0.25 TABLET ORAL 2 TIMES DAILY PRN
Qty: 60 TABLET | Refills: 2 | OUTPATIENT
Start: 2025-01-31